# Patient Record
Sex: FEMALE | Race: WHITE | NOT HISPANIC OR LATINO | Employment: FULL TIME | URBAN - METROPOLITAN AREA
[De-identification: names, ages, dates, MRNs, and addresses within clinical notes are randomized per-mention and may not be internally consistent; named-entity substitution may affect disease eponyms.]

---

## 2020-07-18 ENCOUNTER — HOSPITAL ENCOUNTER (OUTPATIENT)
Facility: HOSPITAL | Age: 50
Setting detail: OBSERVATION
Discharge: HOME | End: 2020-07-19
Attending: EMERGENCY MEDICINE | Admitting: INTERNAL MEDICINE

## 2020-07-18 ENCOUNTER — APPOINTMENT (OUTPATIENT)
Dept: GENERAL RADIOLOGY | Facility: HOSPITAL | Age: 50
End: 2020-07-18

## 2020-07-18 DIAGNOSIS — R07.9 CHEST PAIN, UNSPECIFIED TYPE: Primary | ICD-10-CM

## 2020-07-18 LAB
ALBUMIN SERPL-MCNC: 4.1 G/DL (ref 3.5–5.2)
ALBUMIN/GLOB SERPL: 1.7 G/DL
ALP SERPL-CCNC: 59 U/L (ref 39–117)
ALT SERPL W P-5'-P-CCNC: 26 U/L (ref 1–33)
ANION GAP SERPL CALCULATED.3IONS-SCNC: 15 MMOL/L (ref 5–15)
APTT PPP: 22.8 SECONDS (ref 24–31)
AST SERPL-CCNC: 19 U/L (ref 1–32)
BASOPHILS # BLD AUTO: 0 10*3/MM3 (ref 0–0.2)
BASOPHILS NFR BLD AUTO: 0.3 % (ref 0–1.5)
BILIRUB SERPL-MCNC: <0.2 MG/DL (ref 0–1.2)
BUN SERPL-MCNC: 11 MG/DL (ref 6–20)
BUN SERPL-MCNC: ABNORMAL MG/DL
BUN/CREAT SERPL: ABNORMAL
CALCIUM SPEC-SCNC: 8.9 MG/DL (ref 8.6–10.5)
CHLORIDE SERPL-SCNC: 103 MMOL/L (ref 98–107)
CO2 SERPL-SCNC: 22 MMOL/L (ref 22–29)
CREAT SERPL-MCNC: 0.62 MG/DL (ref 0.57–1)
D DIMER PPP FEU-MCNC: 0.34 MCGFEU/ML (ref 0.17–0.59)
DEPRECATED RDW RBC AUTO: 42 FL (ref 37–54)
EOSINOPHIL # BLD AUTO: 0.3 10*3/MM3 (ref 0–0.4)
EOSINOPHIL NFR BLD AUTO: 3 % (ref 0.3–6.2)
ERYTHROCYTE [DISTWIDTH] IN BLOOD BY AUTOMATED COUNT: 13.2 % (ref 12.3–15.4)
GFR SERPL CREATININE-BSD FRML MDRD: 103 ML/MIN/1.73
GLOBULIN UR ELPH-MCNC: 2.4 GM/DL
GLUCOSE SERPL-MCNC: 119 MG/DL (ref 65–99)
HCT VFR BLD AUTO: 36.4 % (ref 34–46.6)
HGB BLD-MCNC: 12.7 G/DL (ref 12–15.9)
INR PPP: 1.02 (ref 0.9–1.1)
LIPASE SERPL-CCNC: 46 U/L (ref 13–60)
LYMPHOCYTES # BLD AUTO: 3.4 10*3/MM3 (ref 0.7–3.1)
LYMPHOCYTES NFR BLD AUTO: 36.3 % (ref 19.6–45.3)
MCH RBC QN AUTO: 31.5 PG (ref 26.6–33)
MCHC RBC AUTO-ENTMCNC: 34.8 G/DL (ref 31.5–35.7)
MCV RBC AUTO: 90.3 FL (ref 79–97)
MONOCYTES # BLD AUTO: 0.8 10*3/MM3 (ref 0.1–0.9)
MONOCYTES NFR BLD AUTO: 8 % (ref 5–12)
NEUTROPHILS NFR BLD AUTO: 4.9 10*3/MM3 (ref 1.7–7)
NEUTROPHILS NFR BLD AUTO: 52.4 % (ref 42.7–76)
NRBC BLD AUTO-RTO: 0 /100 WBC (ref 0–0.2)
PLATELET # BLD AUTO: 312 10*3/MM3 (ref 140–450)
PMV BLD AUTO: 7.9 FL (ref 6–12)
POTASSIUM SERPL-SCNC: 3.9 MMOL/L (ref 3.5–5.2)
PROT SERPL-MCNC: 6.5 G/DL (ref 6–8.5)
PROTHROMBIN TIME: 10.6 SECONDS (ref 9.6–11.7)
RBC # BLD AUTO: 4.03 10*6/MM3 (ref 3.77–5.28)
SODIUM SERPL-SCNC: 140 MMOL/L (ref 136–145)
TROPONIN T SERPL-MCNC: <0.01 NG/ML (ref 0–0.03)
WBC # BLD AUTO: 9.4 10*3/MM3 (ref 3.4–10.8)

## 2020-07-18 PROCEDURE — 85610 PROTHROMBIN TIME: CPT | Performed by: EMERGENCY MEDICINE

## 2020-07-18 PROCEDURE — 85730 THROMBOPLASTIN TIME PARTIAL: CPT | Performed by: EMERGENCY MEDICINE

## 2020-07-18 PROCEDURE — 85379 FIBRIN DEGRADATION QUANT: CPT | Performed by: EMERGENCY MEDICINE

## 2020-07-18 PROCEDURE — 80053 COMPREHEN METABOLIC PANEL: CPT | Performed by: EMERGENCY MEDICINE

## 2020-07-18 PROCEDURE — 93005 ELECTROCARDIOGRAM TRACING: CPT | Performed by: EMERGENCY MEDICINE

## 2020-07-18 PROCEDURE — 83690 ASSAY OF LIPASE: CPT | Performed by: EMERGENCY MEDICINE

## 2020-07-18 PROCEDURE — 85025 COMPLETE CBC W/AUTO DIFF WBC: CPT | Performed by: EMERGENCY MEDICINE

## 2020-07-18 PROCEDURE — 99284 EMERGENCY DEPT VISIT MOD MDM: CPT

## 2020-07-18 PROCEDURE — 71045 X-RAY EXAM CHEST 1 VIEW: CPT

## 2020-07-18 PROCEDURE — 84484 ASSAY OF TROPONIN QUANT: CPT | Performed by: EMERGENCY MEDICINE

## 2020-07-18 RX ORDER — ASPIRIN 81 MG/1
324 TABLET, CHEWABLE ORAL ONCE
Status: COMPLETED | OUTPATIENT
Start: 2020-07-18 | End: 2020-07-18

## 2020-07-18 RX ORDER — SODIUM CHLORIDE 0.9 % (FLUSH) 0.9 %
10 SYRINGE (ML) INJECTION AS NEEDED
Status: DISCONTINUED | OUTPATIENT
Start: 2020-07-18 | End: 2020-07-19 | Stop reason: HOSPADM

## 2020-07-18 RX ADMIN — ASPIRIN 81 MG 324 MG: 81 TABLET ORAL at 23:56

## 2020-07-19 ENCOUNTER — APPOINTMENT (OUTPATIENT)
Dept: NUCLEAR MEDICINE | Facility: HOSPITAL | Age: 50
End: 2020-07-19

## 2020-07-19 VITALS
HEIGHT: 64 IN | WEIGHT: 233.03 LBS | OXYGEN SATURATION: 98 % | TEMPERATURE: 97.9 F | DIASTOLIC BLOOD PRESSURE: 78 MMHG | HEART RATE: 66 BPM | RESPIRATION RATE: 15 BRPM | BODY MASS INDEX: 39.78 KG/M2 | SYSTOLIC BLOOD PRESSURE: 116 MMHG

## 2020-07-19 PROBLEM — R07.9 CHEST PAIN: Status: ACTIVE | Noted: 2020-07-19

## 2020-07-19 LAB
BH CV NUCLEAR PRIOR STUDY: 3
BH CV STRESS BP STAGE 1: NORMAL
BH CV STRESS BP STAGE 2: NORMAL
BH CV STRESS BP STAGE 3: NORMAL
BH CV STRESS BP STAGE 4: NORMAL
BH CV STRESS COMMENTS STAGE 1: NORMAL
BH CV STRESS COMMENTS STAGE 2: NORMAL
BH CV STRESS DOSE REGADENOSON STAGE 1: 0.4
BH CV STRESS DURATION MIN STAGE 1: 0
BH CV STRESS DURATION MIN STAGE 2: 4
BH CV STRESS DURATION SEC STAGE 1: 10
BH CV STRESS DURATION SEC STAGE 2: 0
BH CV STRESS HR STAGE 1: 84
BH CV STRESS HR STAGE 2: 93
BH CV STRESS HR STAGE 3: 91
BH CV STRESS HR STAGE 4: 82
BH CV STRESS PROTOCOL 1: NORMAL
BH CV STRESS RECOVERY BP: NORMAL MMHG
BH CV STRESS RECOVERY HR: 71 BPM
BH CV STRESS STAGE 1: 1
BH CV STRESS STAGE 2: 2
BH CV STRESS STAGE 3: 3
BH CV STRESS STAGE 4: 4
CHOLEST SERPL-MCNC: 169 MG/DL (ref 0–200)
HDLC SERPL-MCNC: 38 MG/DL (ref 40–60)
HOLD SPECIMEN: NORMAL
HOLD SPECIMEN: NORMAL
LDLC SERPL CALC-MCNC: 105 MG/DL (ref 0–100)
LDLC/HDLC SERPL: 2.77 {RATIO}
MAGNESIUM SERPL-MCNC: 1.9 MG/DL (ref 1.6–2.6)
MAXIMAL PREDICTED HEART RATE: 172 BPM
PERCENT MAX PREDICTED HR: 54.07 %
STRESS BASELINE BP: NORMAL MMHG
STRESS BASELINE HR: 59 BPM
STRESS PERCENT HR: 64 %
STRESS POST PEAK BP: NORMAL MMHG
STRESS POST PEAK HR: 93 BPM
STRESS TARGET HR: 146 BPM
TRIGL SERPL-MCNC: 128 MG/DL (ref 0–150)
TROPONIN T SERPL-MCNC: <0.01 NG/ML (ref 0–0.03)
TROPONIN T SERPL-MCNC: <0.01 NG/ML (ref 0–0.03)
VLDLC SERPL-MCNC: 25.6 MG/DL
WHOLE BLOOD HOLD SPECIMEN: NORMAL
WHOLE BLOOD HOLD SPECIMEN: NORMAL

## 2020-07-19 PROCEDURE — G0378 HOSPITAL OBSERVATION PER HR: HCPCS

## 2020-07-19 PROCEDURE — 78452 HT MUSCLE IMAGE SPECT MULT: CPT | Performed by: INTERNAL MEDICINE

## 2020-07-19 PROCEDURE — 93017 CV STRESS TEST TRACING ONLY: CPT

## 2020-07-19 PROCEDURE — 0 TECHNETIUM SESTAMIBI: Performed by: INTERNAL MEDICINE

## 2020-07-19 PROCEDURE — 99236 HOSP IP/OBS SAME DATE HI 85: CPT | Performed by: INTERNAL MEDICINE

## 2020-07-19 PROCEDURE — A9500 TC99M SESTAMIBI: HCPCS | Performed by: INTERNAL MEDICINE

## 2020-07-19 PROCEDURE — 25010000002 REGADENOSON 0.4 MG/5ML SOLUTION: Performed by: INTERNAL MEDICINE

## 2020-07-19 PROCEDURE — 83735 ASSAY OF MAGNESIUM: CPT | Performed by: NURSE PRACTITIONER

## 2020-07-19 PROCEDURE — 84484 ASSAY OF TROPONIN QUANT: CPT | Performed by: NURSE PRACTITIONER

## 2020-07-19 PROCEDURE — 78452 HT MUSCLE IMAGE SPECT MULT: CPT

## 2020-07-19 PROCEDURE — 93016 CV STRESS TEST SUPVJ ONLY: CPT | Performed by: INTERNAL MEDICINE

## 2020-07-19 PROCEDURE — 93018 CV STRESS TEST I&R ONLY: CPT | Performed by: INTERNAL MEDICINE

## 2020-07-19 PROCEDURE — 80061 LIPID PANEL: CPT | Performed by: NURSE PRACTITIONER

## 2020-07-19 RX ORDER — PANTOPRAZOLE SODIUM 40 MG/1
40 TABLET, DELAYED RELEASE ORAL 2 TIMES DAILY
Qty: 60 TABLET | Refills: 0 | OUTPATIENT
Start: 2020-07-19 | End: 2021-11-23

## 2020-07-19 RX ORDER — ACETAMINOPHEN 325 MG/1
650 TABLET ORAL EVERY 4 HOURS PRN
Status: DISCONTINUED | OUTPATIENT
Start: 2020-07-19 | End: 2020-07-19 | Stop reason: HOSPADM

## 2020-07-19 RX ORDER — NITROGLYCERIN 0.4 MG/1
0.4 TABLET SUBLINGUAL
Status: DISCONTINUED | OUTPATIENT
Start: 2020-07-19 | End: 2020-07-19 | Stop reason: HOSPADM

## 2020-07-19 RX ORDER — ACETAMINOPHEN 650 MG/1
650 SUPPOSITORY RECTAL EVERY 4 HOURS PRN
Status: DISCONTINUED | OUTPATIENT
Start: 2020-07-19 | End: 2020-07-19 | Stop reason: HOSPADM

## 2020-07-19 RX ORDER — ONDANSETRON 4 MG/1
4 TABLET, FILM COATED ORAL EVERY 6 HOURS PRN
Status: DISCONTINUED | OUTPATIENT
Start: 2020-07-19 | End: 2020-07-19 | Stop reason: HOSPADM

## 2020-07-19 RX ORDER — ACETAMINOPHEN 160 MG/5ML
650 SOLUTION ORAL EVERY 4 HOURS PRN
Status: DISCONTINUED | OUTPATIENT
Start: 2020-07-19 | End: 2020-07-19 | Stop reason: HOSPADM

## 2020-07-19 RX ORDER — PANTOPRAZOLE SODIUM 40 MG/1
40 TABLET, DELAYED RELEASE ORAL EVERY MORNING
Status: DISCONTINUED | OUTPATIENT
Start: 2020-07-19 | End: 2020-07-19 | Stop reason: HOSPADM

## 2020-07-19 RX ORDER — IBUPROFEN 200 MG
200 TABLET ORAL EVERY 6 HOURS PRN
COMMUNITY
End: 2020-07-19 | Stop reason: HOSPADM

## 2020-07-19 RX ORDER — SODIUM CHLORIDE 0.9 % (FLUSH) 0.9 %
10 SYRINGE (ML) INJECTION AS NEEDED
Status: DISCONTINUED | OUTPATIENT
Start: 2020-07-19 | End: 2020-07-19 | Stop reason: HOSPADM

## 2020-07-19 RX ORDER — CHOLECALCIFEROL (VITAMIN D3) 125 MCG
5 CAPSULE ORAL NIGHTLY PRN
Status: DISCONTINUED | OUTPATIENT
Start: 2020-07-19 | End: 2020-07-19 | Stop reason: HOSPADM

## 2020-07-19 RX ORDER — ONDANSETRON 2 MG/ML
4 INJECTION INTRAMUSCULAR; INTRAVENOUS EVERY 6 HOURS PRN
Status: DISCONTINUED | OUTPATIENT
Start: 2020-07-19 | End: 2020-07-19 | Stop reason: HOSPADM

## 2020-07-19 RX ORDER — SODIUM CHLORIDE 0.9 % (FLUSH) 0.9 %
10 SYRINGE (ML) INJECTION EVERY 12 HOURS SCHEDULED
Status: DISCONTINUED | OUTPATIENT
Start: 2020-07-19 | End: 2020-07-19 | Stop reason: HOSPADM

## 2020-07-19 RX ADMIN — Medication 10 ML: at 02:09

## 2020-07-19 RX ADMIN — TECHNETIUM TC 99M SESTAMIBI 1 DOSE: 1 INJECTION INTRAVENOUS at 12:15

## 2020-07-19 RX ADMIN — PANTOPRAZOLE SODIUM 40 MG: 40 TABLET, DELAYED RELEASE ORAL at 05:45

## 2020-07-19 RX ADMIN — Medication 10 ML: at 08:07

## 2020-07-19 RX ADMIN — TECHNETIUM TC 99M SESTAMIBI 1 DOSE: 1 INJECTION INTRAVENOUS at 10:20

## 2020-07-19 RX ADMIN — REGADENOSON 0.4 MG: 0.08 INJECTION, SOLUTION INTRAVENOUS at 12:15

## 2020-07-19 NOTE — PLAN OF CARE
Problem: Patient Care Overview  Goal: Plan of Care Review  Outcome: Ongoing (interventions implemented as appropriate)  Flowsheets  Taken 7/19/2020 0250  Progress: no change  Outcome Summary: new admit  will have a myoview in the morning  Taken 7/19/2020 0133  Plan of Care Reviewed With: patient     Problem: Patient Care Overview  Goal: Discharge Needs Assessment  Outcome: Ongoing (interventions implemented as appropriate)  Flowsheets  Taken 7/19/2020 0250  Equipment Needed After Discharge: none  Anticipated Changes Related to Illness: none  Transportation Concerns: car, none  Concerns to be Addressed: no discharge needs identified  Readmission Within the Last 30 Days: no previous admission in last 30 days  Taken 7/19/2020 0132  Equipment Currently Used at Home: none  Taken 7/19/2020 0134  Transportation Anticipated: car, drives self;family or friend will provide  Patient/Family Anticipated Services at Transition: none  Patient/Family Anticipates Transition to: home with family     Problem: Cardiac: ACS (Acute Coronary Syndrome) (Adult)  Goal: Signs and Symptoms of Listed Potential Problems Will be Absent, Minimized or Managed (Cardiac: ACS)  Outcome: Ongoing (interventions implemented as appropriate)  Flowsheets (Taken 7/19/2020 0250)  Problems Assessed (Acute Coronary Syndrome): all  Problems Present (Acute Coronary Syn): none

## 2020-07-19 NOTE — ED PROVIDER NOTES
Subjective   50-year-old female with history of GERD complains of episodic chest pain primarily in the evening since Tuesday associated with nausea and vomiting upper back pain.  Patient did have an episode of right arm pain and tingling but that has resolved.  Pain is nonexertional but there is associated shortness of air.  Patient denies any true DVT or PE or company restricts travel, any calf pain or swelling.  Patient denies any history of tobacco abuse, diabetes, hypertension, hyperlipidemia.  Patient's father reportedly had an MI at 21 years of age but also was an alcoholic and  from complications related to cirrhosis at 46 years of age.  Pain recurred at 2 AM Saturday morning and has been constant all day today.  Pain is moderate.          Review of Systems   Respiratory: Positive for cough and shortness of breath.    Cardiovascular: Positive for chest pain.   Gastrointestinal: Positive for nausea and vomiting.   Musculoskeletal: Positive for back pain.        As per HPI   Neurological:        Tingling right arm, noise negative, resolved.   All other systems reviewed and are negative.      No past medical history on file.    Allergies   Allergen Reactions   • Percocet [Oxycodone-Acetaminophen] Rash       No past surgical history on file.    No family history on file.    Social History     Socioeconomic History   • Marital status:      Spouse name: Not on file   • Number of children: Not on file   • Years of education: Not on file   • Highest education level: Not on file           Objective   Physical Exam   Constitutional: She is oriented to person, place, and time. She appears well-developed and well-nourished.   HENT:   Head: Normocephalic and atraumatic.   Mouth/Throat: Oropharynx is clear and moist.   Eyes: Pupils are equal, round, and reactive to light. Conjunctivae are normal.   Neck: Normal range of motion. Neck supple.   Cardiovascular: Normal rate, regular rhythm, normal heart sounds and  intact distal pulses.   Pulmonary/Chest: Effort normal and breath sounds normal.   Chest wall nontender   Abdominal: Soft. Bowel sounds are normal. She exhibits no distension. There is no tenderness.   Musculoskeletal: Normal range of motion. She exhibits no edema or tenderness.   Neurological: She is alert and oriented to person, place, and time.   Skin: Skin is warm and dry. Capillary refill takes less than 2 seconds.   Psychiatric: She has a normal mood and affect. Her behavior is normal.       Procedures           ED Course  ED Course as of Jul 19 0010   Sat Jul 18, 2020   2302 EKG interpretation: Sinus bradycardia, rate 59, nonspecific ST depression, no STEMI seen    [JR]      ED Course User Index  [JR] Jose Carlos Chau MD                                           MDM  Number of Diagnoses or Management Options  Chest pain, unspecified type:   Diagnosis management comments: Results for orders placed or performed during the hospital encounter of 07/18/20  -Comprehensive Metabolic Panel       Result                      Value             Ref Range           Glucose                     119 (H)           65 - 99 mg/dL       BUN                                                               Creatinine                  0.62              0.57 - 1.00 *       Sodium                      140               136 - 145 mm*       Potassium                   3.9               3.5 - 5.2 mm*       Chloride                    103               98 - 107 mmo*       CO2                         22.0              22.0 - 29.0 *       Calcium                     8.9               8.6 - 10.5 m*       Total Protein               6.5               6.0 - 8.5 g/*       Albumin                     4.10              3.50 - 5.20 *       ALT (SGPT)                  26                1 - 33 U/L          AST (SGOT)                  19                1 - 32 U/L          Alkaline Phosphatase        59                39 - 117 U/L        Total Bilirubin              <0.2              0.0 - 1.2 mg*       eGFR Non  Amer       103               >60 mL/min/1*       Globulin                    2.4               gm/dL               A/G Ratio                   1.7               g/dL                BUN/Creatinine Ratio                                              Anion Gap                   15.0              5.0 - 15.0 m*  -Protime-INR       Result                      Value             Ref Range           Protime                     10.6              9.6 - 11.7 S*       INR                         1.02              0.90 - 1.10    -aPTT       Result                      Value             Ref Range           PTT                         22.8 (L)          24.0 - 31.0 *  -D-dimer, Quantitative       Result                      Value             Ref Range           D-Dimer, Quantitative       0.34              0.17 - 0.59 *  -Troponin       Result                      Value             Ref Range           Troponin T                  <0.010            0.000 - 0.03*  -Lipase       Result                      Value             Ref Range           Lipase                      46                13 - 60 U/L    -CBC Auto Differential       Result                      Value             Ref Range           WBC                         9.40              3.40 - 10.80*       RBC                         4.03              3.77 - 5.28 *       Hemoglobin                  12.7              12.0 - 15.9 *       Hematocrit                  36.4              34.0 - 46.6 %       MCV                         90.3              79.0 - 97.0 *       MCH                         31.5              26.6 - 33.0 *       MCHC                        34.8              31.5 - 35.7 *       RDW                         13.2              12.3 - 15.4 %       RDW-SD                      42.0              37.0 - 54.0 *       MPV                         7.9               6.0 - 12.0 fL       Platelets                    312               140 - 450 10*       Neutrophil %                52.4              42.7 - 76.0 %       Lymphocyte %                36.3              19.6 - 45.3 %       Monocyte %                  8.0               5.0 - 12.0 %        Eosinophil %                3.0               0.3 - 6.2 %         Basophil %                  0.3               0.0 - 1.5 %         Neutrophils, Absolute       4.90              1.70 - 7.00 *       Lymphocytes, Absolute       3.40 (H)          0.70 - 3.10 *       Monocytes, Absolute         0.80              0.10 - 0.90 *       Eosinophils, Absolute       0.30              0.00 - 0.40 *       Basophils, Absolute         0.00              0.00 - 0.20 *       nRBC                        0.0               0.0 - 0.2 /1*  -BUN       Result                      Value             Ref Range           BUN                         11                6 - 20 mg/dL   -Light Blue Top       Result                      Value             Ref Range           Extra Tube                                                    hold for add-on  -Green Top (Gel)       Result                      Value             Ref Range           Extra Tube                                                    Hold for add-ons.  -Lavender Top       Result                      Value             Ref Range           Extra Tube                                                    hold for add-on  -Gold Top - SST       Result                      Value             Ref Range           Extra Tube                                                    Hold for add-ons.    Patient appears well, pain mild at present, nonspecific changes on EKG, for that reason will admit.       Amount and/or Complexity of Data Reviewed  Clinical lab tests: reviewed  Tests in the radiology section of CPT®: reviewed        Final diagnoses:   Chest pain, unspecified type            Jose Carlos Chau MD  07/19/20 0011

## 2020-07-19 NOTE — PLAN OF CARE
Problem: Patient Care Overview  Goal: Plan of Care Review  Outcome: Outcome(s) achieved  Flowsheets  Taken 7/19/2020 1518  Progress: improving  Outcome Summary: Pts myoview was negative and will discharge home. patient remains pain free at this time.  Taken 7/19/2020 0710  Plan of Care Reviewed With: patient;spouse  Goal: Individualization and Mutuality  Outcome: Outcome(s) achieved  Goal: Discharge Needs Assessment  Outcome: Outcome(s) achieved  Goal: Interprofessional Rounds/Family Conf  Outcome: Outcome(s) achieved     Problem: Cardiac: ACS (Acute Coronary Syndrome) (Adult)  Goal: Signs and Symptoms of Listed Potential Problems Will be Absent, Minimized or Managed (Cardiac: ACS)  Outcome: Outcome(s) achieved

## 2020-07-19 NOTE — DISCHARGE SUMMARY
[facility] Medicine Services  DISCHARGE SUMMARY        Prepared For PCP:  Shikha Scherer    Patient Name: Gerri Salcido  : 1970  MRN: [id number]      Date of Admission:   2020    Date of Discharge:  2020    Length of stay:  LOS: 0 days     Hospital Course     Presenting Problem:   Chest pain, unspecified type [R07.9]      Active Hospital Problems    Diagnosis  POA   • Chest pain [R07.9]  Yes     Priority: Medium      Resolved Hospital Problems   No resolved problems to display.           Hospital Course:  Gerri Salcido is a 50 y.o. female who presented with severe chest pain.  The patient has a history of gastroesophageal reflux disease but reports that this is much worse than she has had previously.  The patient had been taking over-the-counter ibuprofen for body aches.  The patient did not have any nausea or vomiting.  The patient was admitted and ruled out for myocardial injury.  The patient then underwent a Kate scan which was completely negative.  The patient will be discharged on Protonix rather than Nexium and a twice daily dosing pattern.    The patient is a full code at the time of discharge.  The patient's medications are as listed in that section of this report.  The patient has no pending studies.  The patient is on a regular diet.  The patient should follow-up with her primary care provider in 3 to 5 days.  The patient is discharged in satisfactory condition.    Recommendation for Outpatient Providers:     Reasons For Change In Medications and Indications for New Medications:  Nexium has been changed to Protonix 40 mg p.o. twice daily      Day of Discharge     HPI:   See physical exam from earlier today    Vital Signs:   Temp:  [97.5 °F (36.4 °C)-98.4 °F (36.9 °C)] 97.6 °F (36.4 °C)  Heart Rate:  [57-70] 58  Resp:  [12-17] 16  BP: (108-150)/(62-89) 108/72     Physical Exam:  Physical Exam  Please see physical exam from earlier today.  Pertinent  and/or  Most Recent Results     Results from last 7 days   Lab Units 07/18/20  2249   WBC 10*3/mm3 9.40   HEMOGLOBIN g/dL 12.7   HEMATOCRIT % 36.4   PLATELETS 10*3/mm3 312   SODIUM mmol/L 140   POTASSIUM mmol/L 3.9   CHLORIDE mmol/L 103   CO2 mmol/L 22.0   BUN  11   CREATININE mg/dL 0.62   GLUCOSE mg/dL 119*   CALCIUM mg/dL 8.9     Results from last 7 days   Lab Units 07/18/20  2249   BILIRUBIN mg/dL <0.2   ALK PHOS U/L 59   ALT (SGPT) U/L 26   AST (SGOT) U/L 19   PROTIME Seconds 10.6   INR  1.02   APTT seconds 22.8*     Results from last 7 days   Lab Units 07/19/20  0423   CHOLESTEROL mg/dL 169   TRIGLYCERIDES mg/dL 128   HDL CHOL mg/dL 38*     Results from last 7 days   Lab Units 07/19/20  1006 07/19/20  0423 07/18/20  2249   TROPONIN T ng/mL <0.010 <0.010 <0.010     Brief Urine Lab Results     None        Microbiology Results Abnormal     None        Xr Chest 1 View    Result Date: 7/19/2020  Impression: No acute cardiopulmonary abnormality.  Electronically Signed By-Jose Angel Campbell On:7/19/2020 8:58 AM This report was finalized on [id number] by  Jose Angel Campbell, .           Test Results Pending at Discharge  None      Procedures Performed  Nuclear stress test which was negative         Consults:   Consults     Date and Time Order Name Status Description    7/19/2020 0008 Inpatient Hospitalist Consult Completed         Discharge Details        Discharge Medications      New Medications      Instructions Start Date   pantoprazole 40 MG EC tablet  Commonly known as:  PROTONIX  Replaces:  esomeprazole 20 MG capsule   40 mg, Oral, 2 times daily         Stop These Medications    esomeprazole 20 MG capsule  Commonly known as:  nexIUM  Replaced by:  pantoprazole 40 MG EC tablet     ibuprofen 200 MG tablet  Commonly known as:  ADVIL,MOTRIN          Allergies   Allergen Reactions   • Percocet [Oxycodone-Acetaminophen] Rash     Discharge Disposition:  Home or Self Care    Diet:  Hospital:  Diet Order   Procedures   • Diet  Cardiac; Healthy Heart     Discharge Activity:     CODE STATUS:    Code Status and Medical Interventions:   Ordered at: 07/19/20 0031     Code Status:    CPR     Medical Interventions (Level of Support Prior to Arrest):    Full     Follow-up Appointments  No future appointments.  Follow-up with primary care provider in 3 to 5 days.    Condition on Discharge:    Stable    Electronically signed by Jaye Chu MD, 07/19/20, 2:52 PM.    Time: I spent  30 minutes on this discharge activity which included face-to-face encounter with the patient/reviewing the data in the system/coordination of the care with the nursing staff as well as consultants/documentation/entering orders.

## 2020-07-19 NOTE — H&P
[facility]      Patient Name: Gerri Salcido  : 1970  MRN: [id number]  Primary Care Physician: Shikha Scherer  Date of admission: 2020    Patient Care Team:  Shikha Scherer as PCP - General (Nurse Practitioner)  Provider, No Known as PCP - Family Medicine          Subjective   History Present Illness     Chief Complaint:   Chief Complaint   Patient presents with   • Chest Pain                50 year old morbidly obese BMI female presents with complaints of chest pain radiating to right arm with numbness, diaphoresis and vomiting intermittently since Tuesday. She denies dizziness but does report shortness of air increased with activity. She reports her father had an MI age 21 but was an alcohol abuser. She denies HTN, CAD, HLD. She reports she does snore but has never been evaluated for sleep apnea. She currently does not smoke. She denies cough , fever or congestion. Troponin was negative , EKG NSR. She has never had a cardiac evaluation. PMH includes GERD. She reports she has a gallbladder and has had numerous ultrasounds in past which were normal. D-Dimer was not elevated. She will be admitted for serial troponin, lipid panel in am and myoview stress test .       Review of Systems   Constitution: Positive for diaphoresis.   HENT: Negative.    Eyes: Negative.    Cardiovascular: Positive for chest pain and dyspnea on exertion.   Respiratory: Positive for shortness of breath.    Endocrine: Negative.    Hematologic/Lymphatic: Negative.    Skin: Negative.    Musculoskeletal: Negative.    Gastrointestinal: Positive for nausea and vomiting.   Genitourinary: Negative.    Neurological: Negative.    Psychiatric/Behavioral: Negative.    Allergic/Immunologic: Negative.            Personal History     Past Medical History:   Past Medical History:   Diagnosis Date   • GERD (gastroesophageal reflux disease)    • Obesity        Surgical History:    History reviewed.  No pertinent surgical history.        Family History: family history includes Heart disease in her father. Otherwise pertinent FHx was reviewed and unremarkable.     Social History:  reports that she has quit smoking. She has never used smokeless tobacco. She reports that she drinks alcohol. She reports that she does not use drugs.      Medications:  Prior to Admission medications    Medication Sig Start Date End Date Taking? Authorizing Provider   esomeprazole (nexIUM) 20 MG capsule Take 20 mg by mouth Every Morning Before Breakfast.   Yes Provider, Historical, MD       Allergies:    Allergies   Allergen Reactions   • Percocet [Oxycodone-Acetaminophen] Rash       Objective   Objective     Vital Signs  Temp:  [97.8 °F (36.6 °C)-98.4 °F (36.9 °C)] 97.8 °F (36.6 °C)  Heart Rate:  [61-70] 61  Resp:  [16-17] 16  BP: (119-150)/(62-89) 134/80  SpO2:  [96 %-99 %] 98 %  on   ;   Device (Oxygen Therapy): (P) room air  Body mass index is 40 kg/m².    Physical Exam   Constitutional: She is oriented to person, place, and time. She appears well-developed and well-nourished.   HENT:   Head: Normocephalic and atraumatic.   Eyes: EOM are normal.   Neck: Normal range of motion. Neck supple.   Cardiovascular: Normal rate, regular rhythm and normal heart sounds.   Pulmonary/Chest: Effort normal and breath sounds normal.   Abdominal: Soft. Bowel sounds are normal.   Genitourinary:   Genitourinary Comments: deferred   Musculoskeletal: Normal range of motion.   Neurological: She is alert and oriented to person, place, and time.   Skin: Skin is warm and dry.   Psychiatric: She has a normal mood and affect. Her behavior is normal. Judgment and thought content normal.   Vitals reviewed.      Results Review:  I have personally reviewed most recent lab results and agree with findings, most notably: troponin.    Results from last 7 days   Lab Units 07/18/20  2249   WBC 10*3/mm3 9.40   HEMOGLOBIN g/dL 12.7   HEMATOCRIT % 36.4   PLATELETS  10*3/mm3 312   INR  1.02     Results from last 7 days   Lab Units 07/18/20  2249   SODIUM mmol/L 140   POTASSIUM mmol/L 3.9   CHLORIDE mmol/L 103   CO2 mmol/L 22.0   BUN  11   CREATININE mg/dL 0.62   GLUCOSE mg/dL 119*   CALCIUM mg/dL 8.9   ALT (SGPT) U/L 26   AST (SGOT) U/L 19   TROPONIN T ng/mL <0.010     Estimated Creatinine Clearance: 131.7 mL/min (by C-G formula based on SCr of 0.62 mg/dL).  Brief Urine Lab Results     None          Microbiology Results (last 10 days)     ** No results found for the last 240 hours. **          ECG/EMG Results (most recent)     Procedure Component Value Units Date/Time    ECG 12 Lead [[id number]] Collected:  07/18/20 2258     Updated:  07/18/20 2300    Narrative:       HEART RATE= 59  bpm  RR Interval= 1012  ms  NM Interval= 148  ms  P Horizontal Axis= 0  deg  P Front Axis= -4  deg  QRSD Interval= 89  ms  QT Interval= 444  ms  QRS Axis= 26  deg  T Wave Axis= 4  deg  - OTHERWISE NORMAL ECG -  Sinus bradycardia  Electronically Signed By:   Date and Time of Study: 2020-07-18 22:58:32                    No radiology results for the last 7 days      Estimated Creatinine Clearance: 131.7 mL/min (by C-G formula based on SCr of 0.62 mg/dL).    Assessment/Plan   Assessment/Plan       Active Hospital Problems    Diagnosis  POA   • Chest pain [R07.9]  Yes      Resolved Hospital Problems   No resolved problems to display.     Chest pain given risk factors for CAD cannot rule out ACS, serial troponin, lipid panel in am , Myoview stress test .Aspirin in ED    GERD- on PPI    Morbid Obesity BMI 40- lifestyle management education .    Possible Undiagnoses NICHOL- refer to sleep study on discharge       VTE Prophylaxis -   Mechanical Order History:     None      Pharmalogical Order History:     Ordered     Dose Route Frequency Stop    Signed and Held  enoxaparin (LOVENOX) syringe 40 mg      40 mg SC Every 24 Hours --          CODE STATUS:    Code Status and Medical Interventions:   Ordered at:  07/19/20 0031     Code Status:    CPR     Medical Interventions (Level of Support Prior to Arrest):    Full       This patient has been examined wearing appropriate Personal Protective Equipment . 07/19/20      I discussed the patient's findings and my recommendations with patient.        Electronically signed by ABE Sutherland, 07/19/20, 12:32 AM.  [facility], [place] Hospitalist Team

## 2023-03-07 ENCOUNTER — OFFICE (AMBULATORY)
Dept: URBAN - METROPOLITAN AREA CLINIC 76 | Facility: CLINIC | Age: 53
End: 2023-03-07

## 2023-03-07 VITALS
HEIGHT: 62 IN | DIASTOLIC BLOOD PRESSURE: 84 MMHG | WEIGHT: 234 LBS | SYSTOLIC BLOOD PRESSURE: 128 MMHG | OXYGEN SATURATION: 98 % | HEART RATE: 79 BPM

## 2023-03-07 DIAGNOSIS — R14.2 ERUCTATION: ICD-10-CM

## 2023-03-07 DIAGNOSIS — R14.0 ABDOMINAL DISTENSION (GASEOUS): ICD-10-CM

## 2023-03-07 DIAGNOSIS — R63.4 ABNORMAL WEIGHT LOSS: ICD-10-CM

## 2023-03-07 DIAGNOSIS — R05.9 COUGH, UNSPECIFIED: ICD-10-CM

## 2023-03-07 DIAGNOSIS — R10.13 EPIGASTRIC PAIN: ICD-10-CM

## 2023-03-07 DIAGNOSIS — Z12.11 ENCOUNTER FOR SCREENING FOR MALIGNANT NEOPLASM OF COLON: ICD-10-CM

## 2023-03-07 DIAGNOSIS — K21.9 GASTRO-ESOPHAGEAL REFLUX DISEASE WITHOUT ESOPHAGITIS: ICD-10-CM

## 2023-03-07 PROCEDURE — 99204 OFFICE O/P NEW MOD 45 MIN: CPT | Performed by: INTERNAL MEDICINE

## 2023-03-07 RX ORDER — ESOMEPRAZOLE MAGNESIUM 40 MG/1
40 CAPSULE, DELAYED RELEASE ORAL
Qty: 90 | Refills: 2 | Status: COMPLETED
End: 2023-04-06

## 2023-04-04 VITALS
HEART RATE: 72 BPM | RESPIRATION RATE: 13 BRPM | HEART RATE: 68 BPM | DIASTOLIC BLOOD PRESSURE: 80 MMHG | DIASTOLIC BLOOD PRESSURE: 75 MMHG | SYSTOLIC BLOOD PRESSURE: 139 MMHG | SYSTOLIC BLOOD PRESSURE: 123 MMHG | SYSTOLIC BLOOD PRESSURE: 144 MMHG | DIASTOLIC BLOOD PRESSURE: 82 MMHG | DIASTOLIC BLOOD PRESSURE: 91 MMHG | SYSTOLIC BLOOD PRESSURE: 157 MMHG | DIASTOLIC BLOOD PRESSURE: 72 MMHG | OXYGEN SATURATION: 92 % | RESPIRATION RATE: 22 BRPM | DIASTOLIC BLOOD PRESSURE: 87 MMHG | TEMPERATURE: 97.2 F | DIASTOLIC BLOOD PRESSURE: 76 MMHG | OXYGEN SATURATION: 95 % | SYSTOLIC BLOOD PRESSURE: 112 MMHG | HEART RATE: 79 BPM | OXYGEN SATURATION: 97 % | SYSTOLIC BLOOD PRESSURE: 147 MMHG | DIASTOLIC BLOOD PRESSURE: 92 MMHG | DIASTOLIC BLOOD PRESSURE: 73 MMHG | WEIGHT: 229 LBS | OXYGEN SATURATION: 98 % | DIASTOLIC BLOOD PRESSURE: 84 MMHG | RESPIRATION RATE: 16 BRPM | OXYGEN SATURATION: 89 % | HEART RATE: 70 BPM | SYSTOLIC BLOOD PRESSURE: 168 MMHG | DIASTOLIC BLOOD PRESSURE: 68 MMHG | HEART RATE: 75 BPM | OXYGEN SATURATION: 93 % | HEART RATE: 62 BPM | HEART RATE: 78 BPM | SYSTOLIC BLOOD PRESSURE: 143 MMHG | OXYGEN SATURATION: 94 % | SYSTOLIC BLOOD PRESSURE: 150 MMHG | RESPIRATION RATE: 18 BRPM | HEIGHT: 64 IN | DIASTOLIC BLOOD PRESSURE: 89 MMHG | OXYGEN SATURATION: 91 % | SYSTOLIC BLOOD PRESSURE: 126 MMHG | HEART RATE: 67 BPM | HEART RATE: 71 BPM | RESPIRATION RATE: 8 BRPM | OXYGEN SATURATION: 100 % | SYSTOLIC BLOOD PRESSURE: 120 MMHG | RESPIRATION RATE: 10 BRPM | OXYGEN SATURATION: 99 % | HEART RATE: 69 BPM | SYSTOLIC BLOOD PRESSURE: 156 MMHG | OXYGEN SATURATION: 96 % | HEART RATE: 74 BPM | DIASTOLIC BLOOD PRESSURE: 41 MMHG | SYSTOLIC BLOOD PRESSURE: 107 MMHG | RESPIRATION RATE: 14 BRPM | RESPIRATION RATE: 15 BRPM | HEART RATE: 65 BPM

## 2023-04-06 ENCOUNTER — OFFICE (AMBULATORY)
Dept: URBAN - METROPOLITAN AREA PATHOLOGY 4 | Facility: PATHOLOGY | Age: 53
End: 2023-04-06

## 2023-04-06 ENCOUNTER — AMBULATORY SURGICAL CENTER (AMBULATORY)
Dept: URBAN - METROPOLITAN AREA SURGERY 17 | Facility: SURGERY | Age: 53
End: 2023-04-06

## 2023-04-06 DIAGNOSIS — R14.2 ERUCTATION: ICD-10-CM

## 2023-04-06 DIAGNOSIS — K63.5 POLYP OF COLON: ICD-10-CM

## 2023-04-06 DIAGNOSIS — K64.9 UNSPECIFIED HEMORRHOIDS: ICD-10-CM

## 2023-04-06 DIAGNOSIS — Z12.11 ENCOUNTER FOR SCREENING FOR MALIGNANT NEOPLASM OF COLON: ICD-10-CM

## 2023-04-06 DIAGNOSIS — K22.70 BARRETT'S ESOPHAGUS WITHOUT DYSPLASIA: ICD-10-CM

## 2023-04-06 DIAGNOSIS — K57.30 DIVERTICULOSIS OF LARGE INTESTINE WITHOUT PERFORATION OR ABS: ICD-10-CM

## 2023-04-06 DIAGNOSIS — K29.70 GASTRITIS, UNSPECIFIED, WITHOUT BLEEDING: ICD-10-CM

## 2023-04-06 DIAGNOSIS — K31.89 OTHER DISEASES OF STOMACH AND DUODENUM: ICD-10-CM

## 2023-04-06 DIAGNOSIS — K44.9 DIAPHRAGMATIC HERNIA WITHOUT OBSTRUCTION OR GANGRENE: ICD-10-CM

## 2023-04-06 DIAGNOSIS — K62.1 RECTAL POLYP: ICD-10-CM

## 2023-04-06 DIAGNOSIS — K22.2 ESOPHAGEAL OBSTRUCTION: ICD-10-CM

## 2023-04-06 DIAGNOSIS — K21.9 GASTRO-ESOPHAGEAL REFLUX DISEASE WITHOUT ESOPHAGITIS: ICD-10-CM

## 2023-04-06 PROBLEM — K22.89 OTHER SPECIFIED DISEASE OF ESOPHAGUS: Status: ACTIVE | Noted: 2023-04-06

## 2023-04-06 PROBLEM — K63.89 OTHER SPECIFIED DISEASES OF INTESTINE: Status: ACTIVE | Noted: 2023-04-06

## 2023-04-06 LAB
GI HISTOLOGY: A. SELECT: (no result)
GI HISTOLOGY: B. UNSPECIFIED: (no result)
GI HISTOLOGY: C. UNSPECIFIED: (no result)
GI HISTOLOGY: D. UNSPECIFIED: (no result)
GI HISTOLOGY: E. UNSPECIFIED: (no result)
GI HISTOLOGY: F. UNSPECIFIED: (no result)
GI HISTOLOGY: PDF REPORT: (no result)

## 2023-04-06 PROCEDURE — 88305 TISSUE EXAM BY PATHOLOGIST: CPT | Performed by: INTERNAL MEDICINE

## 2023-04-06 PROCEDURE — 45380 COLONOSCOPY AND BIOPSY: CPT | Mod: 33 | Performed by: INTERNAL MEDICINE

## 2023-04-06 PROCEDURE — 88342 IMHCHEM/IMCYTCHM 1ST ANTB: CPT | Performed by: INTERNAL MEDICINE

## 2023-04-06 PROCEDURE — 43239 EGD BIOPSY SINGLE/MULTIPLE: CPT | Performed by: INTERNAL MEDICINE

## 2023-04-06 NOTE — SERVICEHPINOTES
DAYLIN HAJI  is a  52  female   who presents today for a  EGD-Colonoscopy   for   the indications listed below. The updated Patient Profile was reviewed prior to the procedure, in conjunction with the Physical Exam, including medical conditions, surgical procedures, medications, allergies, family history and social history. See Physical Exam time stamp below for date and time of HPI completion.Pre-operatively, I reviewed the indication(s) for the procedure, the risks of the procedure [including but not limited to: unexpected bleeding possibly requiring hospitalization and/or unplanned repeat procedures, perforation possibly requiring surgical treatment, missed lesions and complications of sedation/MAC (also explained by anesthesia staff)]. I have evaluated the patient for risks associated with the planned anesthesia and the procedure to be performed and find the patient an acceptable candidate for IV sedation.Multiple opportunities were provided for any questions or concerns, and all questions were answered satisfactorily before any anesthesia was administered. We will proceed with the planned procedure.br

## 2023-04-06 NOTE — SERVICENOTES
The patient was informed that this exam is not 100% accurate, depending on many factors (such as the preparation). Small lesions can be missed. Please call if symptoms persist or new symptoms develop. The right colon was examined twice. 
Procedure difficult due to looping, sharp angulation. Switched to endoscope and generalzed abdominal pressure required to advance to cecum.

## 2023-05-30 ENCOUNTER — HOSPITAL ENCOUNTER (OUTPATIENT)
Dept: CARDIOLOGY | Facility: HOSPITAL | Age: 53
Discharge: HOME | End: 2023-05-30

## 2023-05-30 ENCOUNTER — LAB (OUTPATIENT)
Dept: LAB | Facility: HOSPITAL | Age: 53
End: 2023-05-30

## 2023-05-30 ENCOUNTER — TRANSCRIBE ORDERS (OUTPATIENT)
Dept: ADMINISTRATIVE | Facility: HOSPITAL | Age: 53
End: 2023-05-30

## 2023-05-30 DIAGNOSIS — Z01.818 PRE-OP TESTING: ICD-10-CM

## 2023-05-30 DIAGNOSIS — Z01.818 PRE-OP TESTING: Primary | ICD-10-CM

## 2023-05-30 LAB
ALBUMIN SERPL-MCNC: 4.3 G/DL (ref 3.5–5.2)
ANION GAP SERPL CALCULATED.3IONS-SCNC: 10 MMOL/L (ref 5–15)
BASOPHILS # BLD AUTO: 0 10*3/MM3 (ref 0–0.2)
BASOPHILS NFR BLD AUTO: 0.5 % (ref 0–1.5)
BUN SERPL-MCNC: 10 MG/DL (ref 6–20)
BUN/CREAT SERPL: 15.2 (ref 7–25)
CALCIUM SPEC-SCNC: 8.8 MG/DL (ref 8.6–10.5)
CHLORIDE SERPL-SCNC: 103 MMOL/L (ref 98–107)
CO2 SERPL-SCNC: 24 MMOL/L (ref 22–29)
CREAT SERPL-MCNC: 0.66 MG/DL (ref 0.57–1)
DEPRECATED RDW RBC AUTO: 41.1 FL (ref 37–54)
EGFRCR SERPLBLD CKD-EPI 2021: 107 ML/MIN/1.73
EOSINOPHIL # BLD AUTO: 0.2 10*3/MM3 (ref 0–0.4)
EOSINOPHIL NFR BLD AUTO: 3 % (ref 0.3–6.2)
ERYTHROCYTE [DISTWIDTH] IN BLOOD BY AUTOMATED COUNT: 12.8 % (ref 12.3–15.4)
GLUCOSE SERPL-MCNC: 95 MG/DL (ref 65–99)
HBA1C MFR BLD: 5.5 % (ref 4.8–5.6)
HCT VFR BLD AUTO: 39.1 % (ref 34–46.6)
HGB BLD-MCNC: 12.9 G/DL (ref 12–15.9)
LYMPHOCYTES # BLD AUTO: 3 10*3/MM3 (ref 0.7–3.1)
LYMPHOCYTES NFR BLD AUTO: 42.9 % (ref 19.6–45.3)
MCH RBC QN AUTO: 30.6 PG (ref 26.6–33)
MCHC RBC AUTO-ENTMCNC: 33 G/DL (ref 31.5–35.7)
MCV RBC AUTO: 92.8 FL (ref 79–97)
MONOCYTES # BLD AUTO: 0.4 10*3/MM3 (ref 0.1–0.9)
MONOCYTES NFR BLD AUTO: 6 % (ref 5–12)
MRSA DNA SPEC QL NAA+PROBE: NORMAL
NEUTROPHILS NFR BLD AUTO: 3.3 10*3/MM3 (ref 1.7–7)
NEUTROPHILS NFR BLD AUTO: 47.6 % (ref 42.7–76)
NRBC BLD AUTO-RTO: 0 /100 WBC (ref 0–0.2)
PLATELET # BLD AUTO: 336 10*3/MM3 (ref 140–450)
PMV BLD AUTO: 8 FL (ref 6–12)
POTASSIUM SERPL-SCNC: 3.7 MMOL/L (ref 3.5–5.2)
QT INTERVAL: 418 MS
RBC # BLD AUTO: 4.21 10*6/MM3 (ref 3.77–5.28)
SODIUM SERPL-SCNC: 137 MMOL/L (ref 136–145)
WBC NRBC COR # BLD: 7 10*3/MM3 (ref 3.4–10.8)

## 2023-05-30 PROCEDURE — 93005 ELECTROCARDIOGRAM TRACING: CPT | Performed by: ORTHOPAEDIC SURGERY

## 2023-05-30 PROCEDURE — 87641 MR-STAPH DNA AMP PROBE: CPT

## 2023-05-30 PROCEDURE — 83036 HEMOGLOBIN GLYCOSYLATED A1C: CPT

## 2023-05-30 PROCEDURE — 85025 COMPLETE CBC W/AUTO DIFF WBC: CPT

## 2023-05-30 PROCEDURE — 80048 BASIC METABOLIC PNL TOTAL CA: CPT

## 2023-05-30 PROCEDURE — 82040 ASSAY OF SERUM ALBUMIN: CPT

## 2023-05-30 PROCEDURE — 36415 COLL VENOUS BLD VENIPUNCTURE: CPT

## 2023-07-11 LAB — QT INTERVAL: 418 MS

## 2024-03-22 ENCOUNTER — HOSPITAL ENCOUNTER (EMERGENCY)
Facility: HOSPITAL | Age: 54
Discharge: HOME | End: 2024-03-22
Attending: EMERGENCY MEDICINE | Admitting: INTERNAL MEDICINE
Payer: COMMERCIAL

## 2024-03-22 ENCOUNTER — APPOINTMENT (OUTPATIENT)
Dept: CT IMAGING | Facility: HOSPITAL | Age: 54
End: 2024-03-22
Payer: COMMERCIAL

## 2024-03-22 VITALS
HEIGHT: 64 IN | OXYGEN SATURATION: 97 % | DIASTOLIC BLOOD PRESSURE: 80 MMHG | RESPIRATION RATE: 18 BRPM | SYSTOLIC BLOOD PRESSURE: 124 MMHG | BODY MASS INDEX: 35 KG/M2 | WEIGHT: 205 LBS | HEART RATE: 60 BPM | TEMPERATURE: 98 F

## 2024-03-22 DIAGNOSIS — R10.11 RIGHT UPPER QUADRANT ABDOMINAL PAIN: Primary | ICD-10-CM

## 2024-03-22 LAB
ALBUMIN SERPL-MCNC: 4.8 G/DL (ref 3.5–5.2)
ALBUMIN/GLOB SERPL: 1.8 G/DL
ALP SERPL-CCNC: 66 U/L (ref 39–117)
ALT SERPL W P-5'-P-CCNC: 16 U/L (ref 1–33)
ANION GAP SERPL CALCULATED.3IONS-SCNC: 12 MMOL/L (ref 5–15)
AST SERPL-CCNC: 15 U/L (ref 1–32)
BASOPHILS # BLD AUTO: 0.04 10*3/MM3 (ref 0–0.2)
BASOPHILS NFR BLD AUTO: 0.4 % (ref 0–1.5)
BILIRUB SERPL-MCNC: 0.3 MG/DL (ref 0–1.2)
BILIRUB UR QL STRIP: NEGATIVE
BUN SERPL-MCNC: 16 MG/DL (ref 6–20)
BUN/CREAT SERPL: 22.5 (ref 7–25)
CALCIUM SPEC-SCNC: 9.6 MG/DL (ref 8.6–10.5)
CHLORIDE SERPL-SCNC: 103 MMOL/L (ref 98–107)
CLARITY UR: CLEAR
CO2 SERPL-SCNC: 26 MMOL/L (ref 22–29)
COLOR UR: YELLOW
CREAT SERPL-MCNC: 0.71 MG/DL (ref 0.57–1)
DEPRECATED RDW RBC AUTO: 42.9 FL (ref 37–54)
EGFRCR SERPLBLD CKD-EPI 2021: 103.1 ML/MIN/1.73
EOSINOPHIL # BLD AUTO: 0.21 10*3/MM3 (ref 0–0.4)
EOSINOPHIL NFR BLD AUTO: 2 % (ref 0.3–6.2)
ERYTHROCYTE [DISTWIDTH] IN BLOOD BY AUTOMATED COUNT: 12.6 % (ref 12.3–15.4)
GLOBULIN UR ELPH-MCNC: 2.7 GM/DL
GLUCOSE SERPL-MCNC: 99 MG/DL (ref 65–99)
GLUCOSE UR STRIP-MCNC: NEGATIVE MG/DL
HCT VFR BLD AUTO: 40.8 % (ref 34–46.6)
HGB BLD-MCNC: 13.3 G/DL (ref 12–15.9)
HGB UR QL STRIP.AUTO: NEGATIVE
IMM GRANULOCYTES # BLD AUTO: 0.03 10*3/MM3 (ref 0–0.05)
IMM GRANULOCYTES NFR BLD AUTO: 0.3 % (ref 0–0.5)
KETONES UR QL STRIP: ABNORMAL
LEUKOCYTE ESTERASE UR QL STRIP.AUTO: NEGATIVE
LIPASE SERPL-CCNC: 41 U/L (ref 13–60)
LYMPHOCYTES # BLD AUTO: 3.66 10*3/MM3 (ref 0.7–3.1)
LYMPHOCYTES NFR BLD AUTO: 35.4 % (ref 19.6–45.3)
MCH RBC QN AUTO: 30 PG (ref 26.6–33)
MCHC RBC AUTO-ENTMCNC: 32.6 G/DL (ref 31.5–35.7)
MCV RBC AUTO: 92.1 FL (ref 79–97)
MONOCYTES # BLD AUTO: 0.71 10*3/MM3 (ref 0.1–0.9)
MONOCYTES NFR BLD AUTO: 6.9 % (ref 5–12)
NEUTROPHILS NFR BLD AUTO: 5.69 10*3/MM3 (ref 1.7–7)
NEUTROPHILS NFR BLD AUTO: 55 % (ref 42.7–76)
NITRITE UR QL STRIP: NEGATIVE
NRBC BLD AUTO-RTO: 0 /100 WBC (ref 0–0.2)
PH UR STRIP.AUTO: 5.5 [PH] (ref 5–8)
PLATELET # BLD AUTO: 362 10*3/MM3 (ref 140–450)
PMV BLD AUTO: 9.6 FL (ref 6–12)
POTASSIUM SERPL-SCNC: 4.3 MMOL/L (ref 3.5–5.2)
PROT SERPL-MCNC: 7.5 G/DL (ref 6–8.5)
PROT UR QL STRIP: NEGATIVE
RBC # BLD AUTO: 4.43 10*6/MM3 (ref 3.77–5.28)
SODIUM SERPL-SCNC: 141 MMOL/L (ref 136–145)
SP GR UR STRIP: 1.02 (ref 1–1.03)
UROBILINOGEN UR QL STRIP: ABNORMAL
WBC NRBC COR # BLD AUTO: 10.34 10*3/MM3 (ref 3.4–10.8)

## 2024-03-22 PROCEDURE — 25810000003 LACTATED RINGERS SOLUTION: Performed by: EMERGENCY MEDICINE

## 2024-03-22 PROCEDURE — 96374 THER/PROPH/DIAG INJ IV PUSH: CPT

## 2024-03-22 PROCEDURE — 80053 COMPREHEN METABOLIC PANEL: CPT | Performed by: EMERGENCY MEDICINE

## 2024-03-22 PROCEDURE — 96375 TX/PRO/DX INJ NEW DRUG ADDON: CPT

## 2024-03-22 PROCEDURE — 25510000001 IOPAMIDOL PER 1 ML: Performed by: EMERGENCY MEDICINE

## 2024-03-22 PROCEDURE — 25010000002 ONDANSETRON PER 1 MG: Performed by: EMERGENCY MEDICINE

## 2024-03-22 PROCEDURE — 85025 COMPLETE CBC W/AUTO DIFF WBC: CPT | Performed by: EMERGENCY MEDICINE

## 2024-03-22 PROCEDURE — 83690 ASSAY OF LIPASE: CPT | Performed by: EMERGENCY MEDICINE

## 2024-03-22 PROCEDURE — 81003 URINALYSIS AUTO W/O SCOPE: CPT | Performed by: EMERGENCY MEDICINE

## 2024-03-22 PROCEDURE — 99285 EMERGENCY DEPT VISIT HI MDM: CPT

## 2024-03-22 PROCEDURE — 25010000002 HYDROMORPHONE 1 MG/ML SOLUTION: Performed by: EMERGENCY MEDICINE

## 2024-03-22 PROCEDURE — 74177 CT ABD & PELVIS W/CONTRAST: CPT

## 2024-03-22 RX ORDER — SODIUM CHLORIDE 0.9 % (FLUSH) 0.9 %
10 SYRINGE (ML) INJECTION AS NEEDED
Status: DISCONTINUED | OUTPATIENT
Start: 2024-03-22 | End: 2024-03-22 | Stop reason: HOSPADM

## 2024-03-22 RX ORDER — SODIUM CHLORIDE 9 MG/ML
125 INJECTION, SOLUTION INTRAVENOUS CONTINUOUS
Status: DISCONTINUED | OUTPATIENT
Start: 2024-03-22 | End: 2024-03-22 | Stop reason: HOSPADM

## 2024-03-22 RX ORDER — ONDANSETRON 2 MG/ML
4 INJECTION INTRAMUSCULAR; INTRAVENOUS ONCE
Status: COMPLETED | OUTPATIENT
Start: 2024-03-22 | End: 2024-03-22

## 2024-03-22 RX ADMIN — ONDANSETRON 4 MG: 2 INJECTION INTRAMUSCULAR; INTRAVENOUS at 17:24

## 2024-03-22 RX ADMIN — HYDROMORPHONE HYDROCHLORIDE 0.5 MG: 1 INJECTION, SOLUTION INTRAMUSCULAR; INTRAVENOUS; SUBCUTANEOUS at 17:25

## 2024-03-22 RX ADMIN — SODIUM CHLORIDE, POTASSIUM CHLORIDE, SODIUM LACTATE AND CALCIUM CHLORIDE 1000 ML: 600; 310; 30; 20 INJECTION, SOLUTION INTRAVENOUS at 17:23

## 2024-03-22 RX ADMIN — IOPAMIDOL 100 ML: 755 INJECTION, SOLUTION INTRAVENOUS at 19:14

## 2024-03-22 NOTE — Clinical Note
Level of Care: Med/Surg [1]   Admitting Physician: DICK MOORE [[id number]]   Attending Physician: DICK MOORE [[id number]]

## 2024-03-23 NOTE — ED PROVIDER NOTES
Subjective   History of Present Illness  Chief complaint pain in right upper quadrant    History of present illness a 53-year-old female has had some intermittent pain to the right upper abdomen for about 2 weeks comes and goes but tonight is severe in nature.  Is been there for a couple hours not going away radiates into her back is worse with movement there is no chest pain or shortness of breath no neck arm or jaw pain dizziness or syncope.  No foreign travels leg pain or swelling no recent long car ride plane ride immobilization.  Nothing seem to trigger make it better or worse who does not necessarily affected although she has been nauseous with some decreased appetite no black or bloody stools no urinary complaints.   states he called the office to try to get her into the office but apparently redo the computer system and they were not taking new appointments.      Review of Systems   Constitutional:  Negative for chills and fever.   Respiratory:  Negative for cough, chest tightness and shortness of breath.    Cardiovascular:  Negative for chest pain and leg swelling.   Gastrointestinal:  Positive for abdominal pain. Negative for vomiting.   Genitourinary:  Negative for difficulty urinating and flank pain.   Musculoskeletal:  Positive for back pain.   Neurological:  Negative for dizziness and light-headedness.   Psychiatric/Behavioral:  Negative for confusion.        Past Medical History:   Diagnosis Date    GERD (gastroesophageal reflux disease)     Obesity        Allergies   Allergen Reactions    Percocet [Oxycodone-Acetaminophen] Rash       Past Surgical History:   Procedure Laterality Date    PARTIAL KNEE ARTHROPLASTY      TUBAL ABDOMINAL LIGATION         Family History   Problem Relation Age of Onset    Heart disease Father        Social History     Socioeconomic History    Marital status:    Tobacco Use    Smoking status: Former    Smokeless tobacco: Never   Vaping Use    Vaping status:  Never Used   Substance and Sexual Activity    Alcohol use: Yes     Comment: socially    Drug use: Never    Sexual activity: Defer     Prior to Admission medications    Medication Sig Start Date End Date Taking? Authorizing Provider   Chlorcyclizine-Pseudoephed 25-60 MG tablet Take 1 tablet by mouth Every 8 (Eight) Hours As Needed (congestion). 11/23/21   Santo Baca APRN   esomeprazole (nexIUM) 20 MG capsule Take 40 mg by mouth Every Morning Before Breakfast.    Emergency, Nurse Epic, RN          Objective   Physical Exam  Constitutional is a 53-year-old female awake alert in pain but nontoxic-appearing triage vital signs reviewed.  HEENT extraocular muscles intact sclera clear neck supple no adenopathy no JVD no bruits lungs clear no retraction back no CVA tenderness no spinal tenderness abdomen soft she has significant tenderness right upper quadrant to palpation.  No rebound no guarding good bowel sounds no peritoneal findings or pulsatile masses positive Suggs sign no skin changes redness swelling or rash extremities pulses equal upper lower extremities no edema cords or Homans' sign no evidence of DVT skin warm dry without rashes neurologic awake alert and follows commands without focal weakness  Procedures           ED Course      Results for orders placed or performed during the hospital encounter of 03/22/24   Comprehensive Metabolic Panel    Specimen: Blood   Result Value Ref Range    Glucose 99 65 - 99 mg/dL    BUN 16 6 - 20 mg/dL    Creatinine 0.71 0.57 - 1.00 mg/dL    Sodium 141 136 - 145 mmol/L    Potassium 4.3 3.5 - 5.2 mmol/L    Chloride 103 98 - 107 mmol/L    CO2 26.0 22.0 - 29.0 mmol/L    Calcium 9.6 8.6 - 10.5 mg/dL    Total Protein 7.5 6.0 - 8.5 g/dL    Albumin 4.8 3.5 - 5.2 g/dL    ALT (SGPT) 16 1 - 33 U/L    AST (SGOT) 15 1 - 32 U/L    Alkaline Phosphatase 66 39 - 117 U/L    Total Bilirubin 0.3 0.0 - 1.2 mg/dL    Globulin 2.7 gm/dL    A/G Ratio 1.8 g/dL    BUN/Creatinine Ratio 22.5 7.0  - 25.0    Anion Gap 12.0 5.0 - 15.0 mmol/L    eGFR 103.1 >60.0 mL/min/1.73   Lipase    Specimen: Blood   Result Value Ref Range    Lipase 41 13 - 60 U/L   Urinalysis With Microscopic If Indicated (No Culture) - Urine, Clean Catch    Specimen: Urine, Clean Catch   Result Value Ref Range    Color, UA Yellow Yellow, Straw    Appearance, UA Clear Clear    pH, UA 5.5 5.0 - 8.0    Specific Gravity, UA 1.021 1.005 - 1.030    Glucose, UA Negative Negative    Ketones, UA Trace (A) Negative    Bilirubin, UA Negative Negative    Blood, UA Negative Negative    Protein, UA Negative Negative    Leuk Esterase, UA Negative Negative    Nitrite, UA Negative Negative    Urobilinogen, UA 0.2 E.U./dL 0.2 - 1.0 E.U./dL   CBC Auto Differential    Specimen: Blood   Result Value Ref Range    WBC 10.34 3.40 - 10.80 10*3/mm3    RBC 4.43 3.77 - 5.28 10*6/mm3    Hemoglobin 13.3 12.0 - 15.9 g/dL    Hematocrit 40.8 34.0 - 46.6 %    MCV 92.1 79.0 - 97.0 fL    MCH 30.0 26.6 - 33.0 pg    MCHC 32.6 31.5 - 35.7 g/dL    RDW 12.6 12.3 - 15.4 %    RDW-SD 42.9 37.0 - 54.0 fl    MPV 9.6 6.0 - 12.0 fL    Platelets 362 140 - 450 10*3/mm3    Neutrophil % 55.0 42.7 - 76.0 %    Lymphocyte % 35.4 19.6 - 45.3 %    Monocyte % 6.9 5.0 - 12.0 %    Eosinophil % 2.0 0.3 - 6.2 %    Basophil % 0.4 0.0 - 1.5 %    Immature Grans % 0.3 0.0 - 0.5 %    Neutrophils, Absolute 5.69 1.70 - 7.00 10*3/mm3    Lymphocytes, Absolute 3.66 (H) 0.70 - 3.10 10*3/mm3    Monocytes, Absolute 0.71 0.10 - 0.90 10*3/mm3    Eosinophils, Absolute 0.21 0.00 - 0.40 10*3/mm3    Basophils, Absolute 0.04 0.00 - 0.20 10*3/mm3    Immature Grans, Absolute 0.03 0.00 - 0.05 10*3/mm3    nRBC 0.0 0.0 - 0.2 /100 WBC     CT Abdomen Pelvis With Contrast    Result Date: 3/22/2024  Impression: 1. No acute findings within the abdomen or pelvis. 2. Colonic diverticulosis. 3. 1.2 x 0.6 cm indeterminate left lower lobe pulmonary nodule. I would recommend a CT of the chest on a nonemergent basis. Electronically  Signed: Audie Arvizu MD  3/22/2024 7:29 PM EDT  Workstation ID: [id number]   Medications   sodium chloride 0.9 % flush 10 mL (has no administration in time range)   lactated ringers bolus 1,000 mL (0 mL Intravenous Stopped 3/22/24 1804)   HYDROmorphone (DILAUDID) injection 0.5 mg (0.5 mg Intravenous Given 3/22/24 1725)   ondansetron (ZOFRAN) injection 4 mg (4 mg Intravenous Given 3/22/24 1724)   iopamidol (ISOVUE-370) 76 % injection 100 mL (100 mL Intravenous Given 3/22/24 1914)                                              Medical Decision Making  Medical decision making.  IV established monitor placement review of sinus rhythm patient given Dilaudid 0.5 mg IV Zofran 4 mg IV and a liter lactated Ringer's.  Labs obtained my independent review comprehensive metabolic profile liver lipase CBC normal normal.  CT abdomen pelvis obtained my independent review I do not see pneumonia I do not see any evidence that would suggest acute cholecystitis appendicitis diverticulitis ischemic bowel bowel obstruction aneurysm or dissection or any lower lung pathology.  Radiology review of diverticulosis without diverticulitis small indeterminate 1.2 x 0.6 cm indeterminate left lower lobe pulmonary nodule.  The patient repeat exam was feeling much better her pain resolved abdomen soft nontender good bowel sounds no peritoneal findings or pulsatile masses.  We talked about the findings.  My suspicion is the patient had a gallbladder issue probably needs an ultrasound and a HIDA scan.  I do not see evidence that suggest acute appendicitis or acute cholecystitis or ischemic bowel DVT pulmonary embolism acute cardiac event such as myocardial infarction pericarditis myocarditis pericardial effusion aneurysm or dissection although not a complete list of all possibilities patient initially was agreeable to stay in the hospital to have the workup in the morning because of the significance of her pain she was having.  But then later changed her  mind states she wants to go home.  We talked about what to return for she voiced understanding was discharged home for outpatient management follow-up.    Problems Addressed:  Right upper quadrant abdominal pain: complicated acute illness or injury    Amount and/or Complexity of Data Reviewed  Labs: ordered. Decision-making details documented in ED Course.  Radiology: ordered and independent interpretation performed. Decision-making details documented in ED Course.    Risk  Parenteral controlled substances.  Decision regarding hospitalization.        Final diagnoses:   Right upper quadrant abdominal pain       ED Disposition  ED Disposition       ED Disposition   Decision to Admit    Condition   --    Comment   Level of Care: Med/Surg [1]   Admitting Physician: DICK MOORE [[id number]]   Attending Physician: DICK MOORE [[id number]]                 No follow-up provider specified.       Medication List      No changes were made to your prescriptions during this visit.            Jose Carlos Mcallister MD  03/22/24 [id number]

## 2024-03-23 NOTE — DISCHARGE INSTRUCTIONS
[place] diet no greasy fried fatty spicy food or caffeine.  Return for increasing pain recurrence pain fever vomiting vomiting blood black or bloody stools chest pain neck arm jaw pain coughing up blood shortness of breath black or bloody stool or any other new or worse problems or concerns return immediately ER.  You have a small left-sided pulmonary nodule you will need a chest CT arranged by your primary care provider in the next few months.

## 2025-06-16 ENCOUNTER — APPOINTMENT (OUTPATIENT)
Dept: CT IMAGING | Facility: HOSPITAL | Age: 55
DRG: 418 | End: 2025-06-16
Payer: COMMERCIAL

## 2025-06-16 ENCOUNTER — HOSPITAL ENCOUNTER (INPATIENT)
Facility: HOSPITAL | Age: 55
LOS: 1 days | Discharge: HOME | DRG: 418 | End: 2025-06-19
Attending: EMERGENCY MEDICINE | Admitting: INTERNAL MEDICINE
Payer: COMMERCIAL

## 2025-06-16 ENCOUNTER — APPOINTMENT (OUTPATIENT)
Dept: ULTRASOUND IMAGING | Facility: HOSPITAL | Age: 55
DRG: 418 | End: 2025-06-16
Payer: COMMERCIAL

## 2025-06-16 DIAGNOSIS — R10.13 EPIGASTRIC ABDOMINAL PAIN: ICD-10-CM

## 2025-06-16 DIAGNOSIS — R11.2 NAUSEA AND VOMITING, UNSPECIFIED VOMITING TYPE: ICD-10-CM

## 2025-06-16 DIAGNOSIS — K85.10 GALLSTONE PANCREATITIS: ICD-10-CM

## 2025-06-16 DIAGNOSIS — K81.9 CHOLECYSTITIS: ICD-10-CM

## 2025-06-16 DIAGNOSIS — Z90.49 S/P LAPAROSCOPIC CHOLECYSTECTOMY: ICD-10-CM

## 2025-06-16 DIAGNOSIS — K85.90 ACUTE PANCREATITIS, UNSPECIFIED COMPLICATION STATUS, UNSPECIFIED PANCREATITIS TYPE: Primary | ICD-10-CM

## 2025-06-16 DIAGNOSIS — R10.9 ABDOMINAL PAIN, UNSPECIFIED ABDOMINAL LOCATION: ICD-10-CM

## 2025-06-16 LAB
ALBUMIN SERPL-MCNC: 4.4 G/DL (ref 3.5–5.2)
ALBUMIN/GLOB SERPL: 1.9 G/DL
ALP SERPL-CCNC: 141 U/L (ref 39–117)
ALT SERPL W P-5'-P-CCNC: 158 U/L (ref 1–33)
ANION GAP SERPL CALCULATED.3IONS-SCNC: 12.4 MMOL/L (ref 5–15)
AST SERPL-CCNC: 70 U/L (ref 1–32)
BACTERIA UR QL AUTO: ABNORMAL /HPF
BASOPHILS # BLD AUTO: 0.03 10*3/MM3 (ref 0–0.2)
BASOPHILS NFR BLD AUTO: 0.4 % (ref 0–1.5)
BILIRUB SERPL-MCNC: 0.6 MG/DL (ref 0–1.2)
BILIRUB UR QL STRIP: NEGATIVE
BUN SERPL-MCNC: 11.7 MG/DL (ref 6–20)
BUN/CREAT SERPL: 20.5 (ref 7–25)
CALCIUM SPEC-SCNC: 9.1 MG/DL (ref 8.6–10.5)
CHLORIDE SERPL-SCNC: 105 MMOL/L (ref 98–107)
CLARITY UR: ABNORMAL
CO2 SERPL-SCNC: 20.6 MMOL/L (ref 22–29)
COLOR UR: ABNORMAL
CREAT SERPL-MCNC: 0.57 MG/DL (ref 0.57–1)
DEPRECATED RDW RBC AUTO: 42.4 FL (ref 37–54)
EGFRCR SERPLBLD CKD-EPI 2021: 109.5 ML/MIN/1.73
EOSINOPHIL # BLD AUTO: 0.29 10*3/MM3 (ref 0–0.4)
EOSINOPHIL NFR BLD AUTO: 3.8 % (ref 0.3–6.2)
ERYTHROCYTE [DISTWIDTH] IN BLOOD BY AUTOMATED COUNT: 12.5 % (ref 12.3–15.4)
FLUAV RNA RESP QL NAA+PROBE: NOT DETECTED
FLUBV RNA RESP QL NAA+PROBE: NOT DETECTED
GLOBULIN UR ELPH-MCNC: 2.3 GM/DL
GLUCOSE SERPL-MCNC: 94 MG/DL (ref 65–99)
GLUCOSE UR STRIP-MCNC: NEGATIVE MG/DL
HCT VFR BLD AUTO: 38.8 % (ref 34–46.6)
HGB BLD-MCNC: 13.1 G/DL (ref 12–15.9)
HGB UR QL STRIP.AUTO: ABNORMAL
HOLD SPECIMEN: NORMAL
HYALINE CASTS UR QL AUTO: ABNORMAL /LPF
IMM GRANULOCYTES # BLD AUTO: 0.03 10*3/MM3 (ref 0–0.05)
IMM GRANULOCYTES NFR BLD AUTO: 0.4 % (ref 0–0.5)
KETONES UR QL STRIP: ABNORMAL
LEUKOCYTE ESTERASE UR QL STRIP.AUTO: ABNORMAL
LIPASE SERPL-CCNC: 480 U/L (ref 13–60)
LYMPHOCYTES # BLD AUTO: 2.71 10*3/MM3 (ref 0.7–3.1)
LYMPHOCYTES NFR BLD AUTO: 35.8 % (ref 19.6–45.3)
MCH RBC QN AUTO: 31.1 PG (ref 26.6–33)
MCHC RBC AUTO-ENTMCNC: 33.8 G/DL (ref 31.5–35.7)
MCV RBC AUTO: 92.2 FL (ref 79–97)
MONOCYTES # BLD AUTO: 0.56 10*3/MM3 (ref 0.1–0.9)
MONOCYTES NFR BLD AUTO: 7.4 % (ref 5–12)
NEUTROPHILS NFR BLD AUTO: 3.95 10*3/MM3 (ref 1.7–7)
NEUTROPHILS NFR BLD AUTO: 52.2 % (ref 42.7–76)
NITRITE UR QL STRIP: NEGATIVE
NRBC BLD AUTO-RTO: 0 /100 WBC (ref 0–0.2)
PH UR STRIP.AUTO: 6 [PH] (ref 5–8)
PLAT MORPH BLD: NORMAL
PLATELET # BLD AUTO: 264 10*3/MM3 (ref 140–450)
PMV BLD AUTO: 10.5 FL (ref 6–12)
POTASSIUM SERPL-SCNC: 3.8 MMOL/L (ref 3.5–5.2)
PROT SERPL-MCNC: 6.7 G/DL (ref 6–8.5)
PROT UR QL STRIP: NEGATIVE
RBC # BLD AUTO: 4.21 10*6/MM3 (ref 3.77–5.28)
RBC # UR STRIP: ABNORMAL /HPF
RBC MORPH BLD: NORMAL
REF LAB TEST METHOD: ABNORMAL
RSV RNA RESP QL NAA+PROBE: NOT DETECTED
SARS-COV-2 RNA RESP QL NAA+PROBE: NOT DETECTED
SODIUM SERPL-SCNC: 138 MMOL/L (ref 136–145)
SP GR UR STRIP: 1.02 (ref 1–1.03)
SQUAMOUS #/AREA URNS HPF: ABNORMAL /HPF
UROBILINOGEN UR QL STRIP: ABNORMAL
WBC # UR STRIP: ABNORMAL /HPF
WBC MORPH BLD: NORMAL
WBC NRBC COR # BLD AUTO: 7.57 10*3/MM3 (ref 3.4–10.8)
WHOLE BLOOD HOLD COAG: NORMAL
WHOLE BLOOD HOLD SPECIMEN: NORMAL

## 2025-06-16 PROCEDURE — 83036 HEMOGLOBIN GLYCOSYLATED A1C: CPT

## 2025-06-16 PROCEDURE — 81001 URINALYSIS AUTO W/SCOPE: CPT

## 2025-06-16 PROCEDURE — G0378 HOSPITAL OBSERVATION PER HR: HCPCS

## 2025-06-16 PROCEDURE — 80053 COMPREHEN METABOLIC PANEL: CPT

## 2025-06-16 PROCEDURE — 87637 SARSCOV2&INF A&B&RSV AMP PRB: CPT | Performed by: INTERNAL MEDICINE

## 2025-06-16 PROCEDURE — 36415 COLL VENOUS BLD VENIPUNCTURE: CPT

## 2025-06-16 PROCEDURE — 83690 ASSAY OF LIPASE: CPT

## 2025-06-16 PROCEDURE — 25010000002 HYDROMORPHONE PER 4 MG

## 2025-06-16 PROCEDURE — 25510000001 IOPAMIDOL PER 1 ML

## 2025-06-16 PROCEDURE — 76705 ECHO EXAM OF ABDOMEN: CPT

## 2025-06-16 PROCEDURE — 74177 CT ABD & PELVIS W/CONTRAST: CPT

## 2025-06-16 PROCEDURE — 25010000002 ONDANSETRON PER 1 MG

## 2025-06-16 PROCEDURE — 99285 EMERGENCY DEPT VISIT HI MDM: CPT

## 2025-06-16 PROCEDURE — 25810000003 SODIUM CHLORIDE 0.9 % SOLUTION

## 2025-06-16 PROCEDURE — 85007 BL SMEAR W/DIFF WBC COUNT: CPT

## 2025-06-16 PROCEDURE — 25010000002 CEFTRIAXONE PER 250 MG

## 2025-06-16 PROCEDURE — 25010000002 METRONIDAZOLE 500 MG/100ML SOLUTION

## 2025-06-16 PROCEDURE — 85025 COMPLETE CBC W/AUTO DIFF WBC: CPT

## 2025-06-16 PROCEDURE — 82105 ALPHA-FETOPROTEIN SERUM: CPT

## 2025-06-16 RX ORDER — IOPAMIDOL 755 MG/ML
100 INJECTION, SOLUTION INTRAVASCULAR
Status: COMPLETED | OUTPATIENT
Start: 2025-06-16 | End: 2025-06-16

## 2025-06-16 RX ORDER — MELOXICAM 15 MG/1
15 TABLET ORAL DAILY PRN
COMMUNITY
Start: 2025-05-05 | End: 2025-06-19 | Stop reason: HOSPADM

## 2025-06-16 RX ORDER — HYDROMORPHONE HYDROCHLORIDE 1 MG/ML
0.5 INJECTION, SOLUTION INTRAMUSCULAR; INTRAVENOUS; SUBCUTANEOUS ONCE
Refills: 0 | Status: COMPLETED | OUTPATIENT
Start: 2025-06-16 | End: 2025-06-16

## 2025-06-16 RX ORDER — ONDANSETRON 2 MG/ML
4 INJECTION INTRAMUSCULAR; INTRAVENOUS ONCE
Status: COMPLETED | OUTPATIENT
Start: 2025-06-16 | End: 2025-06-16

## 2025-06-16 RX ORDER — LOSARTAN POTASSIUM 50 MG/1
50 TABLET ORAL DAILY
COMMUNITY
Start: 2025-05-22 | End: 2025-06-19 | Stop reason: HOSPADM

## 2025-06-16 RX ORDER — OMEPRAZOLE 40 MG/1
40 CAPSULE, DELAYED RELEASE ORAL DAILY
COMMUNITY
End: 2025-06-19 | Stop reason: HOSPADM

## 2025-06-16 RX ORDER — CYCLOBENZAPRINE HCL 10 MG
10 TABLET ORAL 3 TIMES DAILY PRN
COMMUNITY
Start: 2025-05-06 | End: 2025-06-19 | Stop reason: HOSPADM

## 2025-06-16 RX ORDER — SODIUM CHLORIDE 0.9 % (FLUSH) 0.9 %
10 SYRINGE (ML) INJECTION AS NEEDED
Status: DISCONTINUED | OUTPATIENT
Start: 2025-06-16 | End: 2025-06-19 | Stop reason: HOSPADM

## 2025-06-16 RX ORDER — SODIUM CHLORIDE, SODIUM LACTATE, POTASSIUM CHLORIDE, CALCIUM CHLORIDE 600; 310; 30; 20 MG/100ML; MG/100ML; MG/100ML; MG/100ML
75 INJECTION, SOLUTION INTRAVENOUS CONTINUOUS
Status: DISCONTINUED | OUTPATIENT
Start: 2025-06-16 | End: 2025-06-17

## 2025-06-16 RX ORDER — METRONIDAZOLE 500 MG/100ML
500 INJECTION, SOLUTION INTRAVENOUS ONCE
Status: COMPLETED | OUTPATIENT
Start: 2025-06-16 | End: 2025-06-16

## 2025-06-16 RX ORDER — DULOXETINE 40 MG/1
40 CAPSULE, DELAYED RELEASE ORAL DAILY
COMMUNITY
Start: 2025-06-12 | End: 2025-06-19 | Stop reason: HOSPADM

## 2025-06-16 RX ORDER — IBUPROFEN 200 MG
400 TABLET ORAL EVERY 6 HOURS PRN
COMMUNITY
End: 2025-06-19 | Stop reason: HOSPADM

## 2025-06-16 RX ADMIN — ONDANSETRON 4 MG: 2 INJECTION, SOLUTION INTRAMUSCULAR; INTRAVENOUS at 16:06

## 2025-06-16 RX ADMIN — IOPAMIDOL 100 ML: 755 INJECTION, SOLUTION INTRAVENOUS at 17:03

## 2025-06-16 RX ADMIN — METRONIDAZOLE 500 MG: 500 INJECTION, SOLUTION INTRAVENOUS at 20:13

## 2025-06-16 RX ADMIN — HYDROMORPHONE HYDROCHLORIDE 0.5 MG: 1 INJECTION, SOLUTION INTRAMUSCULAR; INTRAVENOUS; SUBCUTANEOUS at 16:07

## 2025-06-16 RX ADMIN — SODIUM CHLORIDE 1000 ML: 9 INJECTION, SOLUTION INTRAVENOUS at 18:10

## 2025-06-16 RX ADMIN — CEFTRIAXONE 2000 MG: 2 INJECTION, POWDER, FOR SOLUTION INTRAMUSCULAR; INTRAVENOUS at 18:10

## 2025-06-16 NOTE — Clinical Note
Level of Care: Med/Surg [1]   Admitting Physician: LISETH MANNING [[id number]]   Attending Physician: DICK MOORE [[id number]]

## 2025-06-16 NOTE — ED NOTES
Presents to the ED with C/O abdominal discomfort with nausea since Saturday. Reports this occurs every few months. Pt denies diarrhea. Reports vomiting x 2.

## 2025-06-16 NOTE — ED PROVIDER NOTES
Subjective     Chief Complaint   Patient presents with    Abdominal Pain     Abd pain, upper abd  radiates to back started on Saturday, Pt has some nausea and vomiting and dark urine.       History of Present Illness  Chief complaint: Abdominal pain    Context: Patient is a 54-year-old female with a history of GERD who presents to the emergency department from appointment with Dr. Damon with complaints of upper abdominal pain consistently since Saturday.  Patient states she has had intermittent abdominal pain for last 3-4 months, and states that since Saturday the pain has been significantly worse and consistent.  Patient also states nausea, vomiting, and tea-colored urine all since then.  Patient denies any significant change to bowel habits; states mild constipation over the weekend but no blood in stool or diarrhea.  Patient denies recent illness and denies other symptoms including chest pain and shortness of breath at this time.    PCP: Karin Briseno MD    LNMP: Postmenopausal        Review of Systems   Gastrointestinal:  Positive for abdominal pain, nausea and vomiting.       Past Medical History:   Diagnosis Date    GERD (gastroesophageal reflux disease)     Obesity        Allergies   Allergen Reactions    Percocet [Oxycodone-Acetaminophen] Rash       Past Surgical History:   Procedure Laterality Date    PARTIAL KNEE ARTHROPLASTY      TUBAL ABDOMINAL LIGATION         Family History   Problem Relation Age of Onset    Heart disease Father        Social History     Socioeconomic History    Marital status:    Tobacco Use    Smoking status: Former    Smokeless tobacco: Never   Vaping Use    Vaping status: Never Used   Substance and Sexual Activity    Alcohol use: Yes     Comment: socially    Drug use: Never    Sexual activity: Defer           Objective   Physical Exam  Vitals and nursing note reviewed.   Constitutional:       General: She is not in acute distress.     Appearance: She is obese. She is  "not ill-appearing or toxic-appearing.   HENT:      Head: Normocephalic and atraumatic.   Eyes:      Extraocular Movements: Extraocular movements intact.      Pupils: Pupils are equal, round, and reactive to light.   Cardiovascular:      Rate and Rhythm: Normal rate and regular rhythm.      Heart sounds: Normal heart sounds.   Pulmonary:      Effort: Pulmonary effort is normal. No respiratory distress.      Breath sounds: Normal breath sounds. No stridor. No wheezing, rhonchi or rales.   Abdominal:      General: Bowel sounds are normal. There is no distension.      Palpations: Abdomen is soft. There is no hepatomegaly.      Tenderness: There is abdominal tenderness in the right upper quadrant and epigastric area. There is right CVA tenderness and left CVA tenderness. Positive signs include Suggs's sign. Negative signs include Rovsing's sign and McBurney's sign.   Skin:     General: Skin is warm and dry.      Capillary Refill: Capillary refill takes less than 2 seconds.      Coloration: Skin is not cyanotic or jaundiced.   Neurological:      General: No focal deficit present.      Mental Status: She is alert and oriented to person, place, and time.   Psychiatric:         Mood and Affect: Mood normal.         Behavior: Behavior normal.         Procedures           ED Course  ED Course as of 06/16/25 2234 Mon Jun 16, 2025   1613 Urine results reviewed; waiting for CT to be completed. [RS]   1644 Blood work reviewed; waiting for CT. [RS]      ED Course User Index  [RS] Herve Hughes PA-C      /66   Pulse 56   Temp 97.7 °F (36.5 °C) (Oral)   Resp 20   Ht 162.6 cm (64\")   Wt 88.5 kg (195 lb 1.7 oz)   SpO2 98%   BMI 33.49 kg/m²   Labs Reviewed   COMPREHENSIVE METABOLIC PANEL - Abnormal; Notable for the following components:       Result Value    CO2 20.6 (*)     ALT (SGPT) 158 (*)     AST (SGOT) 70 (*)     Alkaline Phosphatase 141 (*)     All other components within normal limits    Narrative:     GFR " Categories in Chronic Kidney Disease (CKD)              GFR Category          GFR (mL/min/1.73)    Interpretation  G1                    90 or greater        Normal or high (1)  G2                    60-89                Mild decrease (1)  G3a                   45-59                Mild to moderate decrease  G3b                   30-44                Moderate to severe decrease  G4                    15-29                Severe decrease  G5                    14 or less           Kidney failure    (1)In the absence of evidence of kidney disease, neither GFR category G1 or G2 fulfill the criteria for CKD.    eGFR calculation 2021 CKD-EPI creatinine equation, which does not include race as a factor   LIPASE - Abnormal; Notable for the following components:    Lipase 480 (*)     All other components within normal limits   URINALYSIS W/ CULTURE IF INDICATED - Abnormal; Notable for the following components:    Color, UA Dark Yellow (*)     Appearance, UA Cloudy (*)     Ketones, UA Trace (*)     Blood, UA Small (1+) (*)     Leuk Esterase, UA Trace (*)     All other components within normal limits    Narrative:     In absence of clinical symptoms, the presence of pyuria, bacteria, and/or nitrites on the urinalysis result does not correlate with infection.   URINALYSIS, MICROSCOPIC ONLY - Abnormal; Notable for the following components:    RBC, UA 6-10 (*)     Bacteria, UA 4+ (*)     Squamous Epithelial Cells, UA 7-12 (*)     All other components within normal limits   COVID-19/FLUA&B/RSV, NP SWAB IN TRANSPORT MEDIA 1 HR TAT - Normal    Narrative:     Fact sheet for providers: https://www.fda.gov/media/071940/download    Fact sheet for patients: https://www.fda.gov/media/929596/download    Test performed by PCR.   CBC WITH AUTO DIFFERENTIAL - Normal   SCAN SLIDE - Normal    Narrative:     Slide Reviewed     CBC AND DIFFERENTIAL    Narrative:     The following orders were created for panel order CBC & Differential.  Procedure                                Abnormality         Status                     ---------                               -----------         ------                     CBC Auto Differential[[id number]]        Normal              Final result               Scan Slide[[id number]]                   Normal              Final result                 Please view results for these tests on the individual orders.   EXTRA TUBES    Narrative:     The following orders were created for panel order Extra Tubes.  Procedure                               Abnormality         Status                     ---------                               -----------         ------                     Lavender Top[[id number]]                                     Final result               Gold Top - SST[[id number]]                                   Final result               Light Blue Top[[id number]]                                   Final result                 Please view results for these tests on the individual orders.   LAVENDER TOP   GOLD TOP - SST   LIGHT BLUE TOP     Medications   sodium chloride 0.9 % flush 10 mL (has no administration in time range)   lactated ringers infusion (has no administration in time range)   HYDROmorphone (DILAUDID) injection 0.5 mg (0.5 mg Intravenous Given 6/16/25 1607)   ondansetron (ZOFRAN) injection 4 mg (4 mg Intravenous Given 6/16/25 1606)   iopamidol (ISOVUE-370) 76 % injection 100 mL (100 mL Intravenous Given 6/16/25 1703)   sodium chloride 0.9 % bolus 1,000 mL (1,000 mL Intravenous New Bag 6/16/25 1810)   cefTRIAXone (ROCEPHIN) 2,000 mg in sodium chloride 0.9 % 100 mL MBP (0 mg Intravenous Stopped 6/16/25 2013)   metroNIDAZOLE (FLAGYL) IVPB 500 mg (500 mg Intravenous New Bag 6/16/25 2013)     CT Abdomen Pelvis With Contrast  Result Date: 6/16/2025  Impression: 1.Partially contracted gallbladder with mild gallbladder wall thickening and pericholecystic edema versus trace fluid. Correlate for symptoms of  "cholecystitis. Consider further evaluation with right upper quadrant ultrasound. 2.Mild endometrial thickening for the patient's age measuring 8 mm. Correlate with menstrual history. Consider further evaluation with pelvic ultrasound. Electronically Signed: Marcus Newell MD  6/16/2025 5:30 PM EDT  Workstation ID: [id number]                                                     Medical Decision Making  /66   Pulse 56   Temp 97.7 °F (36.5 °C) (Oral)   Resp 20   Ht 162.6 cm (64\")   Wt 88.5 kg (195 lb 1.7 oz)   SpO2 98%   BMI 33.49 kg/m²      Chart review: Patient has no recent ED/UC visits were reviewed.  Patient states abdominal pain has been intermittently present for 3 to 4 months but she has not sought medical care.    Patient is 54-year-old female who presents the emergency department with abdominal complaints.  See full HPI with primary assessment and exam above.    Patient is placed in ED bed in gown for assessment.  IV access is maintained for labs and medication administration if indicated.  Primary differentials for patient include but are not limited to pancreatitis, cholecystitis, ischemic bowel, viral gastritis.    Comorbidities:  has a past medical history of GERD (gastroesophageal reflux disease) and Obesity.    Discussion with provider: Dr. Bergman    Radiology interpretation:  Imaging reviewed by ED Attending physician and myself and interpreted by radiologist.  CT Abdomen Pelvis With Contrast  Result Date: 6/16/2025  Impression: 1.Partially contracted gallbladder with mild gallbladder wall thickening and pericholecystic edema versus trace fluid. Correlate for symptoms of cholecystitis. Consider further evaluation with right upper quadrant ultrasound. 2.Mild endometrial thickening for the patient's age measuring 8 mm. Correlate with menstrual history. Consider further evaluation with pelvic ultrasound. Electronically Signed: Marcus Newell MD  6/16/2025 5:30 PM EDT  Workstation ID: " [id number]    Lab interpretation:  Labs viewed by me significant for elevated lipase and urine with hematuria and what appears to be a contaminated sample.  No significant electrolyte derangement or kidney injury on CMP and no leukocytosis or significant anemia on CBC.    Medications given in ED and ordered for admission:  sodium chloride 0.9 % flush 10 mL (has no administration in time range)  lactated ringers infusion (has no administration in time range)  HYDROmorphone (DILAUDID) injection 0.5 mg (0.5 mg Intravenous Given 6/16/25 1607)  ondansetron (ZOFRAN) injection 4 mg (4 mg Intravenous Given 6/16/25 1606)  iopamidol (ISOVUE-370) 76 % injection 100 mL (100 mL Intravenous Given 6/16/25 1703)  sodium chloride 0.9 % bolus 1,000 mL (1,000 mL Intravenous New Bag 6/16/25 1810)  cefTRIAXone (ROCEPHIN) 2,000 mg in sodium chloride 0.9 % 100 mL MBP (0 mg Intravenous Stopped 6/16/25 2013)  metroNIDAZOLE (FLAGYL) IVPB 500 mg (500 mg Intravenous New Bag 6/16/25 2013)    Patient and family member informed of results and plan of care discussed during repeat physical exam.  Physical exam is unchanged from previous; patient remains alert and oriented, nontoxic in appearance GCS 15.  Patient is informed that the plan of care will be for her to be admitted to the hospital for further observation and evaluation, including a consult from GI tomorrow morning.  Patient verbalizes understanding of and is amenable to this plan of care.    Appropriate PPE worn during exam.    I discussed findings with patient who voiced understanding of discharge instructions, signs and symptoms requiring return to ED; discharged improved and in stable condition with follow up for re-evaluation.  This document is intended for medical expert use only. Reading of this document by patients and/or patient's family without participating medical staff guidance may result in misinterpretation and unintended morbidity.  Any interpretation of such data is the  responsibility of the patient and/or family member responsible for the patient in concert with their primary or specialist providers, not to be left for sources of online searches such as [facility], [employer] or similar queries. Relying on these approaches to knowledge may result in misinterpretation, misguided goals of care and even death should patients or family members try recommendations outside of the realm of professional medical care in a supervised inpatient environment.       Problems Addressed:  Abdominal pain, unspecified abdominal location: complicated acute illness or injury  Acute pancreatitis, unspecified complication status, unspecified pancreatitis type: complicated acute illness or injury  Cholecystitis: complicated acute illness or injury  Nausea and vomiting, unspecified vomiting type: complicated acute illness or injury    Amount and/or Complexity of Data Reviewed  Labs: ordered.  Radiology: ordered.    Risk  Prescription drug management.  Decision regarding hospitalization.        Final diagnoses:   Acute pancreatitis, unspecified complication status, unspecified pancreatitis type   Cholecystitis   Abdominal pain, unspecified abdominal location   Nausea and vomiting, unspecified vomiting type       ED Disposition  ED Disposition       ED Disposition   Decision to Admit    Condition   --    Comment   Level of Care: Med/Surg [1]   Diagnosis: Pancreatitis [[id number]]   Admitting Physician: DICK MOORE [[id number]]   Attending Physician: DICK MOORE [[id number]]                 No follow-up provider specified.       Medication List      No changes were made to your prescriptions during this visit.            Herve Hughes, [place]  06/16/25 [id number]

## 2025-06-17 ENCOUNTER — INPATIENT HOSPITAL (AMBULATORY)
Dept: URBAN - METROPOLITAN AREA HOSPITAL 84 | Facility: HOSPITAL | Age: 55
End: 2025-06-17
Payer: COMMERCIAL

## 2025-06-17 ENCOUNTER — ANESTHESIA (OUTPATIENT)
Dept: GASTROENTEROLOGY | Facility: HOSPITAL | Age: 55
End: 2025-06-17
Payer: COMMERCIAL

## 2025-06-17 ENCOUNTER — ANESTHESIA EVENT (OUTPATIENT)
Dept: GASTROENTEROLOGY | Facility: HOSPITAL | Age: 55
End: 2025-06-17
Payer: COMMERCIAL

## 2025-06-17 DIAGNOSIS — R74.01 ELEVATION OF LEVELS OF LIVER TRANSAMINASE LEVELS: ICD-10-CM

## 2025-06-17 DIAGNOSIS — Z79.85 LONG-TERM (CURRENT) USE OF INJECTABLE NON-INSULIN ANTIDIABET: ICD-10-CM

## 2025-06-17 DIAGNOSIS — K44.9 DIAPHRAGMATIC HERNIA WITHOUT OBSTRUCTION OR GANGRENE: ICD-10-CM

## 2025-06-17 DIAGNOSIS — K82.9 DISEASE OF GALLBLADDER, UNSPECIFIED: ICD-10-CM

## 2025-06-17 DIAGNOSIS — R93.2 ABNORMAL FINDINGS ON DIAGNOSTIC IMAGING OF LIVER AND BILIARY: ICD-10-CM

## 2025-06-17 DIAGNOSIS — E66.01 MORBID (SEVERE) OBESITY DUE TO EXCESS CALORIES: ICD-10-CM

## 2025-06-17 DIAGNOSIS — R10.13 EPIGASTRIC PAIN: ICD-10-CM

## 2025-06-17 DIAGNOSIS — F17.200 NICOTINE DEPENDENCE, UNSPECIFIED, UNCOMPLICATED: ICD-10-CM

## 2025-06-17 DIAGNOSIS — K31.89 OTHER DISEASES OF STOMACH AND DUODENUM: ICD-10-CM

## 2025-06-17 PROBLEM — E66.9 OBESITY (BMI 30-39.9): Status: ACTIVE | Noted: 2025-06-17

## 2025-06-17 PROBLEM — K81.0 ACUTE CHOLECYSTITIS: Status: ACTIVE | Noted: 2025-06-17

## 2025-06-17 PROBLEM — K21.9 GERD WITHOUT ESOPHAGITIS: Status: ACTIVE | Noted: 2025-06-17

## 2025-06-17 PROBLEM — R10.9 ABDOMINAL PAIN: Status: ACTIVE | Noted: 2025-06-17

## 2025-06-17 PROBLEM — I10 ESSENTIAL HYPERTENSION: Status: ACTIVE | Noted: 2025-06-17

## 2025-06-17 PROBLEM — F17.210 CIGARETTE SMOKER: Status: ACTIVE | Noted: 2025-06-17

## 2025-06-17 PROBLEM — K85.10 GALLSTONE PANCREATITIS: Status: ACTIVE | Noted: 2025-06-16

## 2025-06-17 LAB
ALPHA-FETOPROTEIN: <2 NG/ML (ref 0–8.3)
ANION GAP SERPL CALCULATED.3IONS-SCNC: 6.4 MMOL/L (ref 5–15)
BASOPHILS # BLD AUTO: 0.04 10*3/MM3 (ref 0–0.2)
BASOPHILS NFR BLD AUTO: 0.5 % (ref 0–1.5)
BUN SERPL-MCNC: 10 MG/DL (ref 6–20)
BUN/CREAT SERPL: 17.9 (ref 7–25)
CALCIUM SPEC-SCNC: 8.7 MG/DL (ref 8.6–10.5)
CHLORIDE SERPL-SCNC: 111 MMOL/L (ref 98–107)
CHOLEST SERPL-MCNC: 172 MG/DL (ref 0–200)
CO2 SERPL-SCNC: 21.6 MMOL/L (ref 22–29)
CREAT SERPL-MCNC: 0.56 MG/DL (ref 0.57–1)
DEPRECATED RDW RBC AUTO: 42 FL (ref 37–54)
EGFRCR SERPLBLD CKD-EPI 2021: 110 ML/MIN/1.73
EOSINOPHIL # BLD AUTO: 0.36 10*3/MM3 (ref 0–0.4)
EOSINOPHIL NFR BLD AUTO: 4.6 % (ref 0.3–6.2)
ERYTHROCYTE [DISTWIDTH] IN BLOOD BY AUTOMATED COUNT: 12.5 % (ref 12.3–15.4)
FERRITIN SERPL-MCNC: 68 NG/ML (ref 13–150)
GGT SERPL-CCNC: 179 U/L (ref 5–36)
GLUCOSE SERPL-MCNC: 87 MG/DL (ref 65–99)
HAV IGM SERPL QL IA: NORMAL
HBA1C MFR BLD: 5.54 % (ref 4.8–5.6)
HBV CORE IGM SERPL QL IA: NORMAL
HBV SURFACE AG SERPL QL IA: NORMAL
HCT VFR BLD AUTO: 35.9 % (ref 34–46.6)
HCV AB SER QL: NORMAL
HDLC SERPL-MCNC: 41 MG/DL (ref 40–60)
HGB BLD-MCNC: 12 G/DL (ref 12–15.9)
IMM GRANULOCYTES # BLD AUTO: 0.01 10*3/MM3 (ref 0–0.05)
IMM GRANULOCYTES NFR BLD AUTO: 0.1 % (ref 0–0.5)
IRON 24H UR-MRATE: 107 MCG/DL (ref 37–145)
IRON SATN MFR SERPL: 33 % (ref 20–50)
LDLC SERPL CALC-MCNC: 115 MG/DL (ref 0–100)
LDLC/HDLC SERPL: 2.79 {RATIO}
LIPASE SERPL-CCNC: 165 U/L (ref 13–60)
LYMPHOCYTES # BLD AUTO: 2.84 10*3/MM3 (ref 0.7–3.1)
LYMPHOCYTES NFR BLD AUTO: 35.9 % (ref 19.6–45.3)
MCH RBC QN AUTO: 30.7 PG (ref 26.6–33)
MCHC RBC AUTO-ENTMCNC: 33.4 G/DL (ref 31.5–35.7)
MCV RBC AUTO: 91.8 FL (ref 79–97)
MONOCYTES # BLD AUTO: 0.59 10*3/MM3 (ref 0.1–0.9)
MONOCYTES NFR BLD AUTO: 7.5 % (ref 5–12)
NEUTROPHILS NFR BLD AUTO: 4.07 10*3/MM3 (ref 1.7–7)
NEUTROPHILS NFR BLD AUTO: 51.4 % (ref 42.7–76)
NRBC BLD AUTO-RTO: 0 /100 WBC (ref 0–0.2)
PLATELET # BLD AUTO: 253 10*3/MM3 (ref 140–450)
PMV BLD AUTO: 9.6 FL (ref 6–12)
POTASSIUM SERPL-SCNC: 3.4 MMOL/L (ref 3.5–5.2)
QT INTERVAL: 468 MS
QTC INTERVAL: 454 MS
RBC # BLD AUTO: 3.91 10*6/MM3 (ref 3.77–5.28)
SODIUM SERPL-SCNC: 139 MMOL/L (ref 136–145)
TIBC SERPL-MCNC: 328 MCG/DL (ref 298–536)
TRANSFERRIN SERPL-MCNC: 220 MG/DL (ref 200–360)
TRIGL SERPL-MCNC: 83 MG/DL (ref 0–150)
VLDLC SERPL-MCNC: 16 MG/DL (ref 5–40)
WBC NRBC COR # BLD AUTO: 7.91 10*3/MM3 (ref 3.4–10.8)

## 2025-06-17 PROCEDURE — 83690 ASSAY OF LIPASE: CPT

## 2025-06-17 PROCEDURE — 86850 RBC ANTIBODY SCREEN: CPT

## 2025-06-17 PROCEDURE — 86015 ACTIN ANTIBODY EACH: CPT | Performed by: INTERNAL MEDICINE

## 2025-06-17 PROCEDURE — 25010000002 PROPOFOL 10 MG/ML EMULSION: Performed by: NURSE ANESTHETIST, CERTIFIED REGISTERED

## 2025-06-17 PROCEDURE — 85730 THROMBOPLASTIN TIME PARTIAL: CPT

## 2025-06-17 PROCEDURE — 25010000002 METRONIDAZOLE 500 MG/100ML SOLUTION

## 2025-06-17 PROCEDURE — 88305 TISSUE EXAM BY PATHOLOGIST: CPT | Performed by: INTERNAL MEDICINE

## 2025-06-17 PROCEDURE — 81332 SERPINA1 GENE: CPT | Performed by: INTERNAL MEDICINE

## 2025-06-17 PROCEDURE — 25810000003 LACTATED RINGERS PER 1000 ML

## 2025-06-17 PROCEDURE — 25010000002 CEFTRIAXONE PER 250 MG

## 2025-06-17 PROCEDURE — 0DB68ZX EXCISION OF STOMACH, VIA NATURAL OR ARTIFICIAL OPENING ENDOSCOPIC, DIAGNOSTIC: ICD-10-PCS | Performed by: INTERNAL MEDICINE

## 2025-06-17 PROCEDURE — 25010000002 HYDROMORPHONE PER 4 MG

## 2025-06-17 PROCEDURE — G0378 HOSPITAL OBSERVATION PER HR: HCPCS

## 2025-06-17 PROCEDURE — 82977 ASSAY OF GGT: CPT

## 2025-06-17 PROCEDURE — 99284 EMERGENCY DEPT VISIT MOD MDM: CPT | Performed by: STUDENT IN AN ORGANIZED HEALTH CARE EDUCATION/TRAINING PROGRAM

## 2025-06-17 PROCEDURE — 82784 ASSAY IGA/IGD/IGG/IGM EACH: CPT | Performed by: INTERNAL MEDICINE

## 2025-06-17 PROCEDURE — 84466 ASSAY OF TRANSFERRIN: CPT

## 2025-06-17 PROCEDURE — 43239 EGD BIOPSY SINGLE/MULTIPLE: CPT

## 2025-06-17 PROCEDURE — 25810000003 LACTATED RINGERS PER 1000 ML: Performed by: STUDENT IN AN ORGANIZED HEALTH CARE EDUCATION/TRAINING PROGRAM

## 2025-06-17 PROCEDURE — 86708 HEPATITIS A ANTIBODY: CPT

## 2025-06-17 PROCEDURE — 99222 1ST HOSP IP/OBS MODERATE 55: CPT

## 2025-06-17 PROCEDURE — 86140 C-REACTIVE PROTEIN: CPT | Performed by: INTERNAL MEDICINE

## 2025-06-17 PROCEDURE — 82103 ALPHA-1-ANTITRYPSIN TOTAL: CPT | Performed by: INTERNAL MEDICINE

## 2025-06-17 PROCEDURE — 80061 LIPID PANEL: CPT

## 2025-06-17 PROCEDURE — 43239 EGD BIOPSY SINGLE/MULTIPLE: CPT | Performed by: INTERNAL MEDICINE

## 2025-06-17 PROCEDURE — 82390 ASSAY OF CERULOPLASMIN: CPT | Performed by: INTERNAL MEDICINE

## 2025-06-17 PROCEDURE — 82728 ASSAY OF FERRITIN: CPT

## 2025-06-17 PROCEDURE — 93010 ELECTROCARDIOGRAM REPORT: CPT | Performed by: INTERNAL MEDICINE

## 2025-06-17 PROCEDURE — 83540 ASSAY OF IRON: CPT

## 2025-06-17 PROCEDURE — 86900 BLOOD TYPING SEROLOGIC ABO: CPT

## 2025-06-17 PROCEDURE — 85025 COMPLETE CBC W/AUTO DIFF WBC: CPT

## 2025-06-17 PROCEDURE — 25810000003 SODIUM CHLORIDE 0.9 % SOLUTION: Performed by: NURSE ANESTHETIST, CERTIFIED REGISTERED

## 2025-06-17 PROCEDURE — 86381 MITOCHONDRIAL ANTIBODY EACH: CPT | Performed by: INTERNAL MEDICINE

## 2025-06-17 PROCEDURE — 80053 COMPREHEN METABOLIC PANEL: CPT

## 2025-06-17 PROCEDURE — 86901 BLOOD TYPING SEROLOGIC RH(D): CPT

## 2025-06-17 PROCEDURE — 85610 PROTHROMBIN TIME: CPT

## 2025-06-17 PROCEDURE — 93005 ELECTROCARDIOGRAM TRACING: CPT | Performed by: STUDENT IN AN ORGANIZED HEALTH CARE EDUCATION/TRAINING PROGRAM

## 2025-06-17 PROCEDURE — 86038 ANTINUCLEAR ANTIBODIES: CPT

## 2025-06-17 PROCEDURE — 80074 ACUTE HEPATITIS PANEL: CPT

## 2025-06-17 PROCEDURE — 25010000002 LIDOCAINE PF 1% 1 % SOLUTION: Performed by: NURSE ANESTHETIST, CERTIFIED REGISTERED

## 2025-06-17 RX ORDER — HYDROMORPHONE HYDROCHLORIDE 1 MG/ML
0.5 INJECTION, SOLUTION INTRAMUSCULAR; INTRAVENOUS; SUBCUTANEOUS EVERY 4 HOURS PRN
Status: DISCONTINUED | OUTPATIENT
Start: 2025-06-17 | End: 2025-06-18

## 2025-06-17 RX ORDER — SODIUM CHLORIDE 9 MG/ML
INJECTION, SOLUTION INTRAVENOUS CONTINUOUS PRN
Status: DISCONTINUED | OUTPATIENT
Start: 2025-06-17 | End: 2025-06-17 | Stop reason: SURG

## 2025-06-17 RX ORDER — POLYETHYLENE GLYCOL 3350 17 G/17G
17 POWDER, FOR SOLUTION ORAL DAILY
Status: DISCONTINUED | OUTPATIENT
Start: 2025-06-18 | End: 2025-06-19 | Stop reason: HOSPADM

## 2025-06-17 RX ORDER — METRONIDAZOLE 500 MG/100ML
500 INJECTION, SOLUTION INTRAVENOUS EVERY 8 HOURS
Status: DISCONTINUED | OUTPATIENT
Start: 2025-06-17 | End: 2025-06-18

## 2025-06-17 RX ORDER — CYCLOBENZAPRINE HCL 10 MG
10 TABLET ORAL 3 TIMES DAILY PRN
Status: DISCONTINUED | OUTPATIENT
Start: 2025-06-17 | End: 2025-06-19 | Stop reason: HOSPADM

## 2025-06-17 RX ORDER — BISACODYL 5 MG/1
5 TABLET, DELAYED RELEASE ORAL DAILY PRN
Status: DISCONTINUED | OUTPATIENT
Start: 2025-06-17 | End: 2025-06-19 | Stop reason: HOSPADM

## 2025-06-17 RX ORDER — SODIUM CHLORIDE 0.9 % (FLUSH) 0.9 %
10 SYRINGE (ML) INJECTION EVERY 12 HOURS SCHEDULED
Status: DISCONTINUED | OUTPATIENT
Start: 2025-06-17 | End: 2025-06-19 | Stop reason: HOSPADM

## 2025-06-17 RX ORDER — HYDRALAZINE HYDROCHLORIDE 20 MG/ML
10 INJECTION INTRAMUSCULAR; INTRAVENOUS EVERY 6 HOURS PRN
Status: DISCONTINUED | OUTPATIENT
Start: 2025-06-17 | End: 2025-06-19 | Stop reason: HOSPADM

## 2025-06-17 RX ORDER — SODIUM CHLORIDE 0.9 % (FLUSH) 0.9 %
10 SYRINGE (ML) INJECTION AS NEEDED
Status: DISCONTINUED | OUTPATIENT
Start: 2025-06-17 | End: 2025-06-19 | Stop reason: HOSPADM

## 2025-06-17 RX ORDER — PROPOFOL 10 MG/ML
VIAL (ML) INTRAVENOUS AS NEEDED
Status: DISCONTINUED | OUTPATIENT
Start: 2025-06-17 | End: 2025-06-17 | Stop reason: SURG

## 2025-06-17 RX ORDER — PANTOPRAZOLE SODIUM 40 MG/10ML
40 INJECTION, POWDER, LYOPHILIZED, FOR SOLUTION INTRAVENOUS
Status: DISCONTINUED | OUTPATIENT
Start: 2025-06-17 | End: 2025-06-19

## 2025-06-17 RX ORDER — BISACODYL 10 MG
10 SUPPOSITORY, RECTAL RECTAL DAILY PRN
Status: DISCONTINUED | OUTPATIENT
Start: 2025-06-17 | End: 2025-06-19 | Stop reason: HOSPADM

## 2025-06-17 RX ORDER — DULOXETIN HYDROCHLORIDE 20 MG/1
40 CAPSULE, DELAYED RELEASE ORAL DAILY
Status: DISCONTINUED | OUTPATIENT
Start: 2025-06-17 | End: 2025-06-19 | Stop reason: HOSPADM

## 2025-06-17 RX ORDER — SODIUM CHLORIDE 9 MG/ML
40 INJECTION, SOLUTION INTRAVENOUS AS NEEDED
Status: DISCONTINUED | OUTPATIENT
Start: 2025-06-17 | End: 2025-06-19 | Stop reason: HOSPADM

## 2025-06-17 RX ORDER — ONDANSETRON 2 MG/ML
4 INJECTION INTRAMUSCULAR; INTRAVENOUS EVERY 6 HOURS PRN
Status: DISCONTINUED | OUTPATIENT
Start: 2025-06-17 | End: 2025-06-19 | Stop reason: HOSPADM

## 2025-06-17 RX ORDER — LIDOCAINE HYDROCHLORIDE 10 MG/ML
INJECTION, SOLUTION EPIDURAL; INFILTRATION; INTRACAUDAL; PERINEURAL AS NEEDED
Status: DISCONTINUED | OUTPATIENT
Start: 2025-06-17 | End: 2025-06-17 | Stop reason: SURG

## 2025-06-17 RX ORDER — POLYETHYLENE GLYCOL 3350 17 G/17G
17 POWDER, FOR SOLUTION ORAL DAILY PRN
Status: DISCONTINUED | OUTPATIENT
Start: 2025-06-17 | End: 2025-06-19 | Stop reason: HOSPADM

## 2025-06-17 RX ORDER — NICOTINE 21 MG/24HR
1 PATCH, TRANSDERMAL 24 HOURS TRANSDERMAL EVERY 24 HOURS
Status: DISCONTINUED | OUTPATIENT
Start: 2025-06-18 | End: 2025-06-18

## 2025-06-17 RX ORDER — SODIUM CHLORIDE, SODIUM LACTATE, POTASSIUM CHLORIDE, CALCIUM CHLORIDE 600; 310; 30; 20 MG/100ML; MG/100ML; MG/100ML; MG/100ML
125 INJECTION, SOLUTION INTRAVENOUS CONTINUOUS
Status: DISCONTINUED | OUTPATIENT
Start: 2025-06-17 | End: 2025-06-18

## 2025-06-17 RX ORDER — NICOTINE 21 MG/24HR
1 PATCH, TRANSDERMAL 24 HOURS TRANSDERMAL NIGHTLY
Status: DISCONTINUED | OUTPATIENT
Start: 2025-06-17 | End: 2025-06-17

## 2025-06-17 RX ORDER — AMOXICILLIN 250 MG
2 CAPSULE ORAL 2 TIMES DAILY PRN
Status: DISCONTINUED | OUTPATIENT
Start: 2025-06-17 | End: 2025-06-19 | Stop reason: HOSPADM

## 2025-06-17 RX ADMIN — METRONIDAZOLE 500 MG: 500 INJECTION, SOLUTION INTRAVENOUS at 20:49

## 2025-06-17 RX ADMIN — PANTOPRAZOLE SODIUM 40 MG: 40 INJECTION, POWDER, FOR SOLUTION INTRAVENOUS at 08:31

## 2025-06-17 RX ADMIN — PROPOFOL 50 MG: 10 INJECTION, EMULSION INTRAVENOUS at 15:32

## 2025-06-17 RX ADMIN — CEFTRIAXONE SODIUM 1000 MG: 1 INJECTION, POWDER, FOR SOLUTION INTRAMUSCULAR; INTRAVENOUS at 20:48

## 2025-06-17 RX ADMIN — Medication 10 ML: at 08:31

## 2025-06-17 RX ADMIN — SODIUM CHLORIDE, POTASSIUM CHLORIDE, SODIUM LACTATE AND CALCIUM CHLORIDE 125 ML/HR: 600; 310; 30; 20 INJECTION, SOLUTION INTRAVENOUS at 10:35

## 2025-06-17 RX ADMIN — LIDOCAINE HYDROCHLORIDE 50 MG: 10 INJECTION, SOLUTION EPIDURAL; INFILTRATION; INTRACAUDAL; PERINEURAL at 15:29

## 2025-06-17 RX ADMIN — Medication 10 ML: at 22:45

## 2025-06-17 RX ADMIN — PROPOFOL 100 MG: 10 INJECTION, EMULSION INTRAVENOUS at 15:29

## 2025-06-17 RX ADMIN — HYDROMORPHONE HYDROCHLORIDE 0.5 MG: 1 INJECTION, SOLUTION INTRAMUSCULAR; INTRAVENOUS; SUBCUTANEOUS at 03:40

## 2025-06-17 RX ADMIN — METRONIDAZOLE 500 MG: 500 INJECTION, SOLUTION INTRAVENOUS at 11:31

## 2025-06-17 RX ADMIN — HYDROMORPHONE HYDROCHLORIDE 0.5 MG: 1 INJECTION, SOLUTION INTRAMUSCULAR; INTRAVENOUS; SUBCUTANEOUS at 08:32

## 2025-06-17 RX ADMIN — SODIUM CHLORIDE: 9 INJECTION, SOLUTION INTRAVENOUS at 15:26

## 2025-06-17 RX ADMIN — NICOTINE 1 PATCH: 14 PATCH TRANSDERMAL at 01:00

## 2025-06-17 RX ADMIN — DULOXETINE 40 MG: 20 CAPSULE, DELAYED RELEASE ORAL at 08:31

## 2025-06-17 RX ADMIN — METRONIDAZOLE 500 MG: 500 INJECTION, SOLUTION INTRAVENOUS at 03:40

## 2025-06-17 RX ADMIN — SODIUM CHLORIDE, POTASSIUM CHLORIDE, SODIUM LACTATE AND CALCIUM CHLORIDE 75 ML/HR: 600; 310; 30; 20 INJECTION, SOLUTION INTRAVENOUS at 03:40

## 2025-06-17 RX ADMIN — HYDROMORPHONE HYDROCHLORIDE 0.5 MG: 1 INJECTION, SOLUTION INTRAMUSCULAR; INTRAVENOUS; SUBCUTANEOUS at 16:39

## 2025-06-17 NOTE — H&P
Patient Care Team:  Karin Briseno MD as PCP - General (Family Medicine)  Provider, No Known as PCP - Family Medicine    Chief complaint RUQ pain    Subjective     Patient is a 54 y.o. female with pmh of HTN who presents with abdominal pain since Saturday which is described as sharp in epigastric and radiating to her right shoulder. Pain has occurred intermittently over the past couple of years. No fevers or diarrhea. Reports occassional alcohol use and smoked 1/2 pack per day of cigarettes.  Last injection of Tirzepatide on 6/11.  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 Currently reports that she does not want further surgical intervention at this time and the pain waxes and wanes and she typically treats with NSAIDS.    In Er Lipase elevated at 480.  No leukocytosis or anemia.  CT  1.Partially contracted gallbladder with mild gallbladder wall thickening and pericholecystic edema versus trace fluid. Correlate for symptoms of cholecystitis. Consider further evaluation with right upper quadrant ultrasound.  2.Mild endometrial thickening for the patient's age measuring 8 mm. Correlate with menstrual history. Consider further evaluation with pelvic ultrasound.  RUQ ulturasound + Suggs's, Borderline gallbladder wall thickening   She was started on Rocephin and flagyl and received an IV fluid bolus along with pain control.  She will be admitted for surgical consult, IV fluids and pain control.    Review of Systems   Constitutional:  Negative for fever.   Cardiovascular:  Negative for chest pain.   Gastrointestinal:  Positive for abdominal pain, nausea and vomiting.   Genitourinary:  Negative for difficulty urinating.   Musculoskeletal:  Positive for joint swelling.   Neurological:  Negative for weakness.          History  Past Medical History:   Diagnosis Date    GERD (gastroesophageal reflux disease)     Obesity      Past Surgical History:    Procedure Laterality Date    PARTIAL KNEE ARTHROPLASTY      TUBAL ABDOMINAL LIGATION       Family History   Problem Relation Age of Onset    Heart disease Father      Social History     Tobacco Use    Smoking status: Former     Current packs/day: 1.00     Types: Cigarettes     Passive exposure: Current    Smokeless tobacco: Never   Vaping Use    Vaping status: Never Used   Substance Use Topics    Alcohol use: Yes     Comment: socially    Drug use: Never     (Not in a hospital admission)    Allergies:  Percocet [oxycodone-acetaminophen]    Objective     Vital Signs  Temp:  [97.7 °F (36.5 °C)] 97.7 °F (36.5 °C)  Heart Rate:  [53-69] 56  Resp:  [20] 20  BP: (103-130)/(65-87) 130/66     Physical Exam:      General Appearance:    Alert, cooperative, in no acute distress   Head:    Normocephalic, without obvious abnormality, atraumatic   Eyes:            Lids and lashes normal, conjunctivae and sclerae normal, no   icterus, no pallor, corneas clear, PERRLA   Ears:    Ears appear intact with no abnormalities noted   Throat:   No oral lesions, no thrush, oral mucosa dry   Neck:   No adenopathy, supple, trachea midline, no thyromegaly, no   carotid bruit, no JVD   Lungs:     Clear to auscultation,respirations regular, even and                  unlabored    Heart:    Regular rhythm and normal rate, normal S1 and S2, no            murmur, no gallop, no rub, no click   Chest Wall:    No abnormalities observed   Abdomen:     Normal bowel sounds, no masses, no organomegaly, soft        RUQ tender, non-distended, no guarding, positive schaffer's sign   Extremities:   Moves all extremities well, no edema, no cyanosis, no             redness; mild swelling to right knee   Pulses:   Pulses palpable and equal bilaterally   Skin:   No bleeding, bruising or rash   Lymph nodes:   No palpable adenopathy   Neurologic:   Cranial nerves 2 - 12 grossly intact, sensation intact, DTR       present and equal bilaterally       Results Review:      Imaging Results (Last 24 Hours)       Procedure Component Value Units Date/Time    US Abdomen Limited [[id number]] Collected: 06/16/25 2229     Updated: 06/16/25 2235    Narrative:      US ABDOMEN LIMITED    Date of Exam: 6/16/2025 9:28 PM EDT    Indication: cholecystitis.    Comparison: Same day contrast-enhanced CT of the abdomen and pelvis.    Technique: Grayscale and color Doppler ultrasound evaluation of the right upper quadrant was performed.      Findings:    Limited study due to overlying bowel gas.    The liver appears normal in size and echogenicity. No focal lesions identified. Normal flow is present within the hepatic vasculature.    Multiple small gallstones are visualized. There is borderline gallbladder wall thickening measuring 0.3 cm. Gallbladder is partially contracted. No pericholecystic fluid is visualized. Positive Suggs sign was reported by the sonographer. Common bile   duct is within normal limits in caliber and measures 0.4 cm    The pancreas is not visualized due to overlying bowel gas.    The right kidney appears normal in size with no focal lesions. No evidence of nephrolithiasis or hydronephrosis. The right kidney measures 11.1 x 5.6 x 5.7 cm.           Impression:      Impression:    Cholelithiasis. Borderline gallbladder wall thickening. Positive Suggs sign was reported by the sonographer. Cannot exclude acute cholecystitis. Correlate with patient symptoms. This can be further evaluated with HIDA scan as clinically warranted.        Electronically Signed: Marcus Newell MD    6/16/2025 10:33 PM EDT    Workstation ID: [id number]    CT Abdomen Pelvis With Contrast [[id number]] Collected: 06/16/25 1723     Updated: 06/16/25 1732    Narrative:      CT ABDOMEN PELVIS W CONTRAST    Date of Exam: 6/16/2025 4:45 PM EDT    Indication: epigastric and bilateral CVA pain.    Comparison: Contrast-enhanced CT of the abdomen pelvis performed on March 22, 2024    Technique: Axial CT images were  obtained of the abdomen and pelvis following the uneventful intravenous administration of iodinated contrast. Sagittal and coronal reconstructions were performed.  Automated exposure control and iterative reconstruction   methods were used.        Findings:    Lung Bases:  No focal consolidation or effusion. Faintly calcified nodule in the left lower lobe appears stable when compared to prior CT, likely representing granuloma.    Peritoneum:  No free intraperitoneal air or fluid.     Abdominal wall:  Small fat-containing umbilical hernia is visualized.    Liver:  Liver is normal in size and contour. No focal lesions.    Biliary/Gallbladder:  The gallbladder is partially contracted. There is evidence of mild gallbladder wall thickening and pericholecystic edema versus trace fluid. No radiopaque gallstones are visualized. The biliary tree is nondilated.    Pancreas:  Pancreas is within normal limits. There is no evidence of pancreatic mass or peripancreatic inflammatory changes.    Spleen:  Spleen is normal in size and contour.    Gastrointestinal/Mesentery:   No evidence of bowel obstruction or gross inflammatory changes. The appendix appears within normal limits. There is sigmoid diverticulosis without evidence of diverticulitis.    Adrenals:  Adrenal glands are unremarkable.    Kidneys:  The kidneys are in anatomic position. No evidence of nephrolithiasis. No evidence of hydronephrosis or significant perinephric fat stranding.    Bladder:   The urinary bladder is unremarkable.    Reproductive organs:    There is mild endometrial thickening for the patient's age measuring 8 mm. No adnexal masses are visualized.    Lymph Nodes:  No significant adenopathy is identified.     Vasculature:  Small scattered calcified plaques are visualized. The abdominal aorta is normal in caliber.    Osseous Structures:    No acute fracture or aggressive lesions.        Impression:      Impression:  1.Partially contracted gallbladder  with mild gallbladder wall thickening and pericholecystic edema versus trace fluid. Correlate for symptoms of cholecystitis. Consider further evaluation with right upper quadrant ultrasound.  2.Mild endometrial thickening for the patient's age measuring 8 mm. Correlate with menstrual history. Consider further evaluation with pelvic ultrasound.        Electronically Signed: Marcus Newell MD    6/16/2025 5:30 PM EDT    Workstation ID: [id number]             Lab Results (last 24 hours)       Procedure Component Value Units Date/Time    Lipase [[id number]]  (Abnormal) Collected: 06/17/25 0259    Specimen: Blood from Arm, Left Updated: 06/17/25 0328     Lipase 165 U/L     Basic Metabolic Panel [[id number]]  (Abnormal) Collected: 06/17/25 0259    Specimen: Blood from Arm, Left Updated: 06/17/25 0328     Glucose 87 mg/dL      BUN 10.0 mg/dL      Creatinine 0.56 mg/dL      Sodium 139 mmol/L      Potassium 3.4 mmol/L      Chloride 111 mmol/L      CO2 21.6 mmol/L      Calcium 8.7 mg/dL      BUN/Creatinine Ratio 17.9     Anion Gap 6.4 mmol/L      eGFR 110.0 mL/min/1.73     Narrative:      GFR Categories in Chronic Kidney Disease (CKD)              GFR Category          GFR (mL/min/1.73)    Interpretation  G1                    90 or greater        Normal or high (1)  G2                    60-89                Mild decrease (1)  G3a                   45-59                Mild to moderate decrease  G3b                   30-44                Moderate to severe decrease  G4                    15-29                Severe decrease  G5                    14 or less           Kidney failure    (1)In the absence of evidence of kidney disease, neither GFR category G1 or G2 fulfill the criteria for CKD.    eGFR calculation 2021 CKD-EPI creatinine equation, which does not include race as a factor    Lipid Panel [[id number]]  (Abnormal) Collected: 06/17/25 0259    Specimen: Blood from Arm, Left Updated: 06/17/25 0328     Total Cholesterol 172  mg/dL      Triglycerides 83 mg/dL      HDL Cholesterol 41 mg/dL      LDL Cholesterol  115 mg/dL      VLDL Cholesterol 16 mg/dL      LDL/HDL Ratio 2.79    Narrative:      Cholesterol Reference Ranges  (U.S. Department of Health and Human Services ATP III Classifications)    Desirable          <200 mg/dL  Borderline High    200-239 mg/dL  High Risk          >240 mg/dL      Triglyceride Reference Ranges  (U.S. Department of Health and Human Services ATP III Classifications)    Normal           <150 mg/dL  Borderline High  150-199 mg/dL  High             200-499 mg/dL  Very High        >500 mg/dL    HDL Reference Ranges  (U.S. Department of Health and Human Services ATP III Classifications)    Low     <40 mg/dl (major risk factor for CHD)  High    >60 mg/dl ('negative' risk factor for CHD)        LDL Reference Ranges  (U.S. Department of Health and Human Services ATP III Classifications)    Optimal          <100 mg/dL  Near Optimal     100-129 mg/dL  Borderline High  130-159 mg/dL  High             160-189 mg/dL  Very High        >189 mg/dL    LDL is calculated using the NIH LDL-C calculation.      CBC & Differential [[id number]]  (Normal) Collected: 06/17/25 0259    Specimen: Blood from Arm, Left Updated: 06/17/25 0304    Narrative:      The following orders were created for panel order CBC & Differential.  Procedure                               Abnormality         Status                     ---------                               -----------         ------                     CBC Auto Differential[[id number]]        Normal              Final result                 Please view results for these tests on the individual orders.    CBC Auto Differential [[id number]]  (Normal) Collected: 06/17/25 0259    Specimen: Blood from Arm, Left Updated: 06/17/25 0304     WBC 7.91 10*3/mm3      RBC 3.91 10*6/mm3      Hemoglobin 12.0 g/dL      Hematocrit 35.9 %      MCV 91.8 fL      MCH 30.7 pg      MCHC 33.4 g/dL      RDW 12.5 %       RDW-SD 42.0 fl      MPV 9.6 fL      Platelets 253 10*3/mm3      Neutrophil % 51.4 %      Lymphocyte % 35.9 %      Monocyte % 7.5 %      Eosinophil % 4.6 %      Basophil % 0.5 %      Immature Grans % 0.1 %      Neutrophils, Absolute 4.07 10*3/mm3      Lymphocytes, Absolute 2.84 10*3/mm3      Monocytes, Absolute 0.59 10*3/mm3      Eosinophils, Absolute 0.36 10*3/mm3      Basophils, Absolute 0.04 10*3/mm3      Immature Grans, Absolute 0.01 10*3/mm3      nRBC 0.0 /100 WBC     Hemoglobin A1c [[id number]]  (Normal) Collected: 06/16/25 1555    Specimen: Blood from Arm, Left Updated: 06/17/25 0029     Hemoglobin A1C 5.54 %     Narrative:      Hemoglobin A1C Ranges:    Increased Risk for Diabetes  5.7% to 6.4%  Diabetes                     >= 6.5%  Diabetic Goal                < 7.0%    COVID-19, FLU A/B, RSV PCR 1 HR TAT - Swab, Nasopharynx [[id number]]  (Normal) Collected: 06/16/25 2000    Specimen: Swab from Nasopharynx Updated: 06/16/25 2047     COVID19 Not Detected     Influenza A PCR Not Detected     Influenza B PCR Not Detected     RSV, PCR Not Detected    Narrative:      Fact sheet for providers: https://www.fda.gov/media/519215/download    Fact sheet for patients: https://www.fda.gov/media/090232/download    Test performed by PCR.    Lipase [[id number]]  (Abnormal) Collected: 06/16/25 1555    Specimen: Blood from Arm, Left Updated: 06/16/25 1637     Lipase 480 U/L     Extra Tubes [[id number]] Collected: 06/16/25 1555    Specimen: Blood from Arm, Left Updated: 06/16/25 1631    Narrative:      The following orders were created for panel order Extra Tubes.  Procedure                               Abnormality         Status                     ---------                               -----------         ------                     Lavender Top[[id number]]                                     Final result               Gold Top - SST[[id number]]                                   Final result               Light Blue  Top[[id number]]                                   Final result                 Please view results for these tests on the individual orders.    Lavender Top [[id number]] Collected: 06/16/25 1555    Specimen: Blood from Arm, Left Updated: 06/16/25 1631     Extra Tube hold for add-on     Comment: Auto resulted       Gold Top - SST [[id number]] Collected: 06/16/25 1555    Specimen: Blood from Arm, Left Updated: 06/16/25 1631     Extra Tube Hold for add-ons.     Comment: Auto resulted.       Light Blue Top [[id number]] Collected: 06/16/25 1555    Specimen: Blood from Arm, Left Updated: 06/16/25 1631     Extra Tube Hold for add-ons.     Comment: Auto resulted       Comprehensive Metabolic Panel [[id number]]  (Abnormal) Collected: 06/16/25 1555    Specimen: Blood from Arm, Left Updated: 06/16/25 1628     Glucose 94 mg/dL      BUN 11.7 mg/dL      Creatinine 0.57 mg/dL      Sodium 138 mmol/L      Potassium 3.8 mmol/L      Comment: Specimen hemolyzed.  Result may be falsely elevated.        Chloride 105 mmol/L      CO2 20.6 mmol/L      Calcium 9.1 mg/dL      Total Protein 6.7 g/dL      Albumin 4.4 g/dL      ALT (SGPT) 158 U/L      Comment: Specimen hemolyzed.  Result may  be falsely elevated.        AST (SGOT) 70 U/L      Comment: Specimen hemolyzed.  Result may be falsely elevated.        Alkaline Phosphatase 141 U/L      Total Bilirubin 0.6 mg/dL      Globulin 2.3 gm/dL      A/G Ratio 1.9 g/dL      BUN/Creatinine Ratio 20.5     Anion Gap 12.4 mmol/L      eGFR 109.5 mL/min/1.73     Narrative:      GFR Categories in Chronic Kidney Disease (CKD)              GFR Category          GFR (mL/min/1.73)    Interpretation  G1                    90 or greater        Normal or high (1)  G2                    60-89                Mild decrease (1)  G3a                   45-59                Mild to moderate decrease  G3b                   30-44                Moderate to severe decrease  G4                    15-29                Severe  decrease  G5                    14 or less           Kidney failure    (1)In the absence of evidence of kidney disease, neither GFR category G1 or G2 fulfill the criteria for CKD.    eGFR calculation 2021 CKD-EPI creatinine equation, which does not include race as a factor    CBC & Differential [[id number]]  (Normal) Collected: 06/16/25 1555    Specimen: Blood from Arm, Left Updated: 06/16/25 1624    Narrative:      The following orders were created for panel order CBC & Differential.  Procedure                               Abnormality         Status                     ---------                               -----------         ------                     CBC Auto Differential[[id number]]        Normal              Final result               Scan Slide[[id number]]                   Normal              Final result                 Please view results for these tests on the individual orders.    CBC Auto Differential [[id number]]  (Normal) Collected: 06/16/25 1555    Specimen: Blood from Arm, Left Updated: 06/16/25 1624     WBC 7.57 10*3/mm3      RBC 4.21 10*6/mm3      Hemoglobin 13.1 g/dL      Hematocrit 38.8 %      MCV 92.2 fL      MCH 31.1 pg      MCHC 33.8 g/dL      RDW 12.5 %      RDW-SD 42.4 fl      MPV 10.5 fL      Platelets 264 10*3/mm3      Neutrophil % 52.2 %      Lymphocyte % 35.8 %      Monocyte % 7.4 %      Eosinophil % 3.8 %      Basophil % 0.4 %      Immature Grans % 0.4 %      Neutrophils, Absolute 3.95 10*3/mm3      Lymphocytes, Absolute 2.71 10*3/mm3      Monocytes, Absolute 0.56 10*3/mm3      Eosinophils, Absolute 0.29 10*3/mm3      Basophils, Absolute 0.03 10*3/mm3      Immature Grans, Absolute 0.03 10*3/mm3      nRBC 0.0 /100 WBC     Scan Slide [[id number]]  (Normal) Collected: 06/16/25 1555    Specimen: Blood from Arm, Left Updated: 06/16/25 1624     RBC Morphology Normal     WBC Morphology Normal     Platelet Morphology Normal    Narrative:      Slide Reviewed      Urinalysis With Culture If  Indicated - Urine, Clean Catch [[id number]]  (Abnormal) Collected: 06/16/25 1548    Specimen: Urine, Clean Catch Updated: 06/16/25 1608     Color, UA Dark Yellow     Appearance, UA Cloudy     pH, UA 6.0     Specific Gravity, UA 1.024     Glucose, UA Negative     Ketones, UA Trace     Bilirubin, UA Negative     Blood, UA Small (1+)     Protein, UA Negative     Leuk Esterase, UA Trace     Nitrite, UA Negative     Urobilinogen, UA 1.0 E.U./dL    Narrative:      In absence of clinical symptoms, the presence of pyuria, bacteria, and/or nitrites on the urinalysis result does not correlate with infection.    Urinalysis, Microscopic Only - Urine, Clean Catch [[id number]]  (Abnormal) Collected: 06/16/25 1548    Specimen: Urine, Clean Catch Updated: 06/16/25 1608     RBC, UA 6-10 /HPF      WBC, UA 0-2 /HPF      Comment: Urine culture not indicated.        Bacteria, UA 4+ /HPF      Squamous Epithelial Cells, UA 7-12 /HPF      Hyaline Casts, UA None Seen /LPF      Methodology Automated Microscopy             I reviewed the patient's new clinical results.    Assessment & Plan       Acute cholecystitis    Abdominal pain    Essential hypertension    Cigarette smoker    GERD without esophagitis    Obesity (BMI 30-39.9)      Cholecystitis   NPO   -consult surgery   -Pain  and nausea control    HTN- prn hydralazine while NPO    Nicotine patch for smoking. Encouraged smoking cessation    Right knee pain- pt to follow up with ortho as outpatient.   Hx of partial knee replacement.    GI: Protonix  VTE: SCD    CODE STATUS:  Code status (Patient has no pulse and is not breathing):  CPR (Attempt to Resuscitate)  Medical Interventions (Patient has pulse or is breathing):  Full Support  Level of Support Discussed with:  Patient    Admission Status:  I believe this patient meets observation status    Expected length of stay:  2 midnights or less    I discussed the patient's findings and my recommendations with patient.     Brie Blanco,  ABE  06/17/25  05:11 EDT

## 2025-06-17 NOTE — CONSULTS
General Surgery Consult Note  Requesting Provider:  Dr. Manning (Hospitalist)    Reason for Consult:  cholecystitis     Chief Complaint:  abdominal pain     History of Present Illness:  Gerri Salcido is a 54 y.o. female who presents to [facility] c/o upper abdominal pain associate with nausea and vomiting.  Patient reports history of similar complaints and was previously seen in the ED in March for what sounds like biliary colic.  Patient elected to go home prior to completion of rib quadrant ultrasound.  Current symptoms have been worsening over the last couple days.  Patient describes epigastric pain with radiation to the back.  Denies fevers, chills, jaundice, or darkening urine.    In the ED, patient was afebrile and hemodynamically stable.  Abdominal exam was remarkable for right upper quadrant and epigastric tenderness with a positive Suggs sign.  Labs were remarkable for pancreatitis (lipase 480), mild transaminitis (AST 70, ), and elevated alkaline phosphatase (); WBC normal (7.5).  CT abdomen/pelvis demonstrate evidence of a contracted gallbladder with pericholecystic edema.  Right upper quadrant ultrasound demonstrate evidence of cholelithiasis with borderline gallbladder wall thickening (3 mm); CBD 4 mm.  General surgery was consulted for further recommendations.    Past Medical History:   Diagnosis Date    GERD (gastroesophageal reflux disease)     Obesity      Past Surgical History:   Procedure Laterality Date    PARTIAL KNEE ARTHROPLASTY      TUBAL ABDOMINAL LIGATION       Family History   Problem Relation Age of Onset    Heart disease Father      Social History     Socioeconomic History    Marital status:    Tobacco Use    Smoking status: Former     Current packs/day: 1.00     Types: Cigarettes     Passive exposure: Current    Smokeless tobacco: Never   Vaping Use    Vaping status: Never Used   Substance and Sexual Activity    Alcohol use: Yes     Comment: socially    Drug use:  Never    Sexual activity: Defer       Medications:  (Not in a hospital admission)     Allergies   Allergen Reactions    Percocet [Oxycodone-Acetaminophen] Rash       Review of Systems:  Negative in a 12 point ROS except for as above described.    Physical Examination:  Temp:  [97.7 °F (36.5 °C)-98 °F (36.7 °C)] 97.8 °F (36.6 °C)  Heart Rate:  [53-69] 56  Resp:  [15-20] 18  BP: (103-130)/(57-87) 109/66  I/O last 3 completed shifts:  In: 100 [IV Piggyback:100]  Out: -     General: No acute distress, conversant  Eyes: Anicteric sclerae, EOMs intact  Lungs: normal respiratory effort, bilateral chest rise with inspiration  Abdomen: soft, epigastric > RUQ TTP, ND, dull to percussion; no guarding or rigidity; negative schaffer sign   Skin: Warm; no rash.  No jaundice.    Labs:  WBC   Date Value Ref Range Status   06/17/2025 7.91 3.40 - 10.80 10*3/mm3 Final     Hemoglobin   Date Value Ref Range Status   06/17/2025 12.0 12.0 - 15.9 g/dL Final     Hematocrit   Date Value Ref Range Status   06/17/2025 35.9 34.0 - 46.6 % Final     MCV   Date Value Ref Range Status   06/17/2025 91.8 79.0 - 97.0 fL Final     Platelets   Date Value Ref Range Status   06/17/2025 253 140 - 450 10*3/mm3 Final     Neutrophil %   Date Value Ref Range Status   06/17/2025 51.4 42.7 - 76.0 % Final     Lab Results   Component Value Date    GLUCOSE 87 06/17/2025    BUN 10.0 06/17/2025    CREATININE 0.56 (L) 06/17/2025     06/17/2025    K 3.4 (L) 06/17/2025     (H) 06/17/2025    CALCIUM 8.7 06/17/2025    PROTEINTOT 6.7 06/16/2025    ALBUMIN 4.4 06/16/2025     (H) 06/16/2025    AST 70 (H) 06/16/2025    ALKPHOS 141 (H) 06/16/2025    BILITOT 0.6 06/16/2025    GLOB 2.3 06/16/2025    AGRATIO 1.9 06/16/2025    BCR 17.9 06/17/2025    ANIONGAP 6.4 06/17/2025    EGFR 110.0 06/17/2025     Lab Results   Component Value Date    INR 1.02 07/18/2020    INR 1.0 07/10/2015    PROTIME 10.6 07/18/2020    PROTIME 10.8 07/10/2015         No results found for:  "\"BLOODCX\"    Images:  US Abdomen Limited  Narrative: US ABDOMEN LIMITED    Date of Exam: 6/16/2025 9:28 PM EDT    Indication: cholecystitis.    Comparison: Same day contrast-enhanced CT of the abdomen and pelvis.    Technique: Grayscale and color Doppler ultrasound evaluation of the right upper quadrant was performed.    Findings:    Limited study due to overlying bowel gas.    The liver appears normal in size and echogenicity. No focal lesions identified. Normal flow is present within the hepatic vasculature.    Multiple small gallstones are visualized. There is borderline gallbladder wall thickening measuring 0.3 cm. Gallbladder is partially contracted. No pericholecystic fluid is visualized. Positive Suggs sign was reported by the sonographer. Common bile   duct is within normal limits in caliber and measures 0.4 cm    The pancreas is not visualized due to overlying bowel gas.    The right kidney appears normal in size with no focal lesions. No evidence of nephrolithiasis or hydronephrosis. The right kidney measures 11.1 x 5.6 x 5.7 cm.       Impression: Impression:    Cholelithiasis. Borderline gallbladder wall thickening. Positive Suggs sign was reported by the sonographer. Cannot exclude acute cholecystitis. Correlate with patient symptoms. This can be further evaluated with HIDA scan as clinically warranted.    Electronically Signed: Marcus Newell MD    6/16/2025 10:33 PM EDT    Workstation ID: [id number]  CT Abdomen Pelvis With Contrast  Narrative: CT ABDOMEN PELVIS W CONTRAST    Date of Exam: 6/16/2025 4:45 PM EDT    Indication: epigastric and bilateral CVA pain.    Comparison: Contrast-enhanced CT of the abdomen pelvis performed on March 22, 2024    Technique: Axial CT images were obtained of the abdomen and pelvis following the uneventful intravenous administration of iodinated contrast. Sagittal and coronal reconstructions were performed.  Automated exposure control and iterative reconstruction "   methods were used.    Findings:    Lung Bases:  No focal consolidation or effusion. Faintly calcified nodule in the left lower lobe appears stable when compared to prior CT, likely representing granuloma.    Peritoneum:  No free intraperitoneal air or fluid.     Abdominal wall:  Small fat-containing umbilical hernia is visualized.    Liver:  Liver is normal in size and contour. No focal lesions.    Biliary/Gallbladder:  The gallbladder is partially contracted. There is evidence of mild gallbladder wall thickening and pericholecystic edema versus trace fluid. No radiopaque gallstones are visualized. The biliary tree is nondilated.    Pancreas:  Pancreas is within normal limits. There is no evidence of pancreatic mass or peripancreatic inflammatory changes.    Spleen:  Spleen is normal in size and contour.    Gastrointestinal/Mesentery:   No evidence of bowel obstruction or gross inflammatory changes. The appendix appears within normal limits. There is sigmoid diverticulosis without evidence of diverticulitis.    Adrenals:  Adrenal glands are unremarkable.    Kidneys:  The kidneys are in anatomic position. No evidence of nephrolithiasis. No evidence of hydronephrosis or significant perinephric fat stranding.    Bladder:   The urinary bladder is unremarkable.    Reproductive organs:    There is mild endometrial thickening for the patient's age measuring 8 mm. No adnexal masses are visualized.    Lymph Nodes:  No significant adenopathy is identified.     Vasculature:  Small scattered calcified plaques are visualized. The abdominal aorta is normal in caliber.    Osseous Structures:    No acute fracture or aggressive lesions.  Impression: Impression:  1.Partially contracted gallbladder with mild gallbladder wall thickening and pericholecystic edema versus trace fluid. Correlate for symptoms of cholecystitis. Consider further evaluation with right upper quadrant ultrasound.  2.Mild endometrial thickening for the  patient's age measuring 8 mm. Correlate with menstrual history. Consider further evaluation with pelvic ultrasound.    Electronically Signed: Marcus Newell MD    6/16/2025 5:30 PM EDT    Workstation ID: [id number]       I personally reviewed the available laboratory values and radiographic studies.      Assessment:  54 y.o. female with gallstone pancreatitis.  Disagree with diagnosis of cholecystitis, as patient has a normal gallbladder wall and no evidence of leukocytosis.  Pericholecystic fluid is likely due to pancreatitis.  As such, patient would benefit from cholecystectomy prior to discharge.  Too tender for surgery today.    Plan:  Trend LFTs  Ok for diet today from surgical standpoint, but would wait until GI has evaluated the patient; NPO at midnight   No need for abx from surgical perspective   Plan for robotic cholecystectomy with IOC tomorrow afternoon unless GI is planning for ERCP. In such case, cholecystectomy can be deferred until prior to discharge.  Rest of care per primary team     Please do not hesitate to call me with any questions or concerns.  Thank you for involving me in the care of your patient, and I look forward to taking care of the patient with you.     Abel Kaplna MD   General Surgery  06/17/25   10:02 EDT     Much of this encounter note is an electronic transcription/translation of spoken language to printed text.  The electronic translation of spoken language may permit erroneous, or at times, nonsensical words or phrases to be inadvertently transcribed.  Although I have reviewed the note for such errors, some may still exist.

## 2025-06-17 NOTE — OP NOTE
ESOPHAGOGASTRODUODENOSCOPY Procedure Report    Patient Name:  Gerri Salcido  YOB: 1970    Date of Surgery:  6/17/2025     Pre-Op Diagnosis:  Epigastric abdominal pain [R10.13]       Post-Op Diagnosis Codes:     * Epigastric abdominal pain [R10.13]    Post Op Diagnosis:  Gastritis  Hiatal hernia    Procedure/CPT® Codes:  NM ESOPHAGOGASTRODUODENOSCOPY TRANSORAL DIAGNOSTIC [95601]    Procedure(s):  ESOPHAGOGASTRODUODENOSCOPY with gastric biopsy    Staff:  Surgeon(s):  INDU Monroy MD      Anesthesia: Monitored Anesthesia Care    Code status:  Discussed code status with patient and they will remain full code    Description of Procedure:  A description of the procedure as well as risks, benefits and alternative methods were explained to the patient who voiced understanding and signed the corresponding consent form. A physical exam was performed and vital signs were monitored throughout the procedure.    An upper GI endoscope was placed into the mouth and proceeded through the esophagus, stomach and second portion of the duodenum without difficulty. The scope was then retroflexed and the fundus was visualized. The procedure was not difficult and there were no immediate complications.  There was no blood loss.    EGD Findings:  1.  Mild erythema in the stomach consistent with gastritis.  No ulcers were seen.  Biopsies were obtained with cold forceps from stomach antrum and body to rule out H. pylori  2.  Medium size sliding hiatal hernia, 4 to 5 cm in length, Hill grade 4  3.  Normal mucosa of the whole esophagus  4.  Normal mucosa of the duodenal bulb and second portion of the duodenum    Recommendations:  -Continue PPI daily  - Follow-up biopsies for H. pylori, treat if positive  - Suspect pain mostly due to acute pancreatitis, likely due to medication  - Continue IV fluids, trend lipase and CRP daily  -Okay for clear liquid diet now and advancing as tolerated    PAU Monroy MD     Date:  6/17/2025    Time: 15:39 EDT

## 2025-06-17 NOTE — ANESTHESIA POSTPROCEDURE EVALUATION
Patient: Gerri Salcido    Procedure Summary       Date: 06/17/25 Room / Location: [facility] ENDOSCOPY 1 / [facility] ENDOSCOPY    Anesthesia Start: 1526 Anesthesia Stop: 1537    Procedure: ESOPHAGOGASTRODUODENOSCOPY with gastric biopsy Diagnosis:       Epigastric abdominal pain      (Epigastric abdominal pain [R10.13])    Surgeons: INDU Monroy MD Provider: Steve Little MD    Anesthesia Type: MAC ASA Status: 2            Anesthesia Type: MAC    Vitals  Vitals Value Taken Time   /79 06/17/25 16:04   Temp     Pulse 57 06/17/25 16:09   Resp 15 06/17/25 15:39   SpO2 97 % 06/17/25 16:09   Vitals shown include unfiled device data.        Post Anesthesia Care and Evaluation    Patient location during evaluation: PACU  Patient participation: complete - patient participated  Level of consciousness: awake  Pain scale: See nurse's notes for pain score.  Pain management: adequate    Airway patency: patent  Anesthetic complications: No anesthetic complications  PONV Status: none  Cardiovascular status: acceptable  Respiratory status: acceptable and spontaneous ventilation  Hydration status: acceptable    Comments: Patient seen and examined postoperatively; vital signs stable; SpO2 greater than or equal to 90%; cardiopulmonary status stable; nausea/vomiting adequately controlled; pain adequately controlled; no apparent anesthesia complications; patient discharged from anesthesia care when discharge criteria were met

## 2025-06-17 NOTE — PLAN OF CARE
Goal Outcome Evaluation:  Plan of Care Reviewed With: patient        Progress: improving        Patient admitted after EGD, plan Lap Kristen tomorrow

## 2025-06-17 NOTE — CONSULTS
"    GI CONSULT  NOTE:    Referring Provider: Dr. Manning    Chief complaint: Epigastric pain    Subjective .     History of present illness: Gerri Salcido is a 54 y.o. female  with a past medical history of nondysplastic Miller's esophagus, GERD, hiatal hernia, diverticulosis, hypertension, and tobacco use who presented to [facility] ED on 6/16/2025 with complaints of abdominal pain.  Thus, GI was consulted.  Patient reports intermittent sharp epigastric pain over the past 2 years which severely worsened 3 days ago.  Reports pain began to radiate to the back and shoulder.  Denies specific aggravating or alleviating factors.  Pain is not related to specific foods or eating.  Reports associated bloating.  Reports nausea with vomiting several days ago, which has since resolved.  No hematemesis or coffee-ground emesis.  Reports she is typically constipated at home and uses MiraLAX as needed.  Denies BRBPR or melena.  Reports she had restarted Mounjaro last month, she had previously been on Mounjaro x 9 months last year.  Denies a history of pancreatitis or pancreas issues.  Denies family medical history of pancreas issues or cancer.  Reports feeling hungry.  Weight is stable.  Denies any history of heavy alcohol use.  Drinks 1 alcoholic beverage every 1 to 2 months.  Smokes tobacco.  Reports taking ibuprofen \"like candy\" over the past several days due to abdominal pain, and she typically takes ibuprofen almost daily for joint pain.      Endo History:  4/2023 EGD/colonoscopy by Dr. Moreira-small hiatal hernia.  Nondysplastic Miller's.  Mild gastritis, negative for H. pylori.  Normal duodenum.  Moderate diverticulosis.  Erythema in the sigmoid with benign biopsy.  HP and mucosal polyps.  Internal hemorrhoids.    Past Medical History:  Past Medical History:   Diagnosis Date    GERD (gastroesophageal reflux disease)     Obesity        Past Surgical History:  Past Surgical History:   Procedure Laterality Date    PARTIAL KNEE " ARTHROPLASTY      TUBAL ABDOMINAL LIGATION         Social History:  Social History     Tobacco Use    Smoking status: Former     Current packs/day: 1.00     Types: Cigarettes     Passive exposure: Current    Smokeless tobacco: Never   Vaping Use    Vaping status: Never Used   Substance Use Topics    Alcohol use: Yes     Comment: socially    Drug use: Never       Family History:  Family History   Problem Relation Age of Onset    Heart disease Father        Medications:  (Not in a hospital admission)      Scheduled Meds:cefTRIAXone, 1,000 mg, Intravenous, Q24H  DULoxetine, 40 mg, Oral, Daily  metroNIDAZOLE, 500 mg, Intravenous, Q8H  nicotine, 1 patch, Transdermal, Nightly  pantoprazole, 40 mg, Intravenous, Q AM  [START ON 6/18/2025] polyethylene glycol, 17 g, Oral, Daily  sodium chloride, 10 mL, Intravenous, Q12H      Continuous Infusions:lactated ringers, 75 mL/hr, Last Rate: Stopped (06/17/25 1036)  lactated ringers, 125 mL/hr, Last Rate: 125 mL/hr (06/17/25 1035)      PRN Meds:.  senna-docusate sodium **AND** polyethylene glycol **AND** bisacodyl **AND** bisacodyl    Calcium Replacement - Follow Nurse / BPA Driven Protocol    hydrALAZINE    HYDROmorphone    Magnesium Standard Dose Replacement - Follow Nurse / BPA Driven Protocol    ondansetron    Phosphorus Replacement - Follow Nurse / BPA Driven Protocol    Potassium Replacement - Follow Nurse / BPA Driven Protocol    [COMPLETED] Insert Peripheral IV **AND** sodium chloride    sodium chloride    sodium chloride    ALLERGIES:  Percocet [oxycodone-acetaminophen]    ROS:  The following systems were reviewed and negative;   Constitution:  No fevers, chills, no unintentional weight loss  Skin: no rash, no jaundice  Eyes:  No blurry vision, no eye pain  HENT:  No change in hearing or smell  Resp:  No dyspnea or cough  CV:  No chest pain or palpitations  :  No dysuria, hematuria  Musculoskeletal:  No leg cramps or arthralgias  Neuro:  No tremor, no numbness  Psych:  No  depression or confusion    Objective     Vital Signs:   Vitals:    06/16/25 2001 06/16/25 2326 06/17/25 0514 06/17/25 0800   BP: 130/66  109/57 109/66   BP Location:   Left arm Left arm   Patient Position:    Lying   Pulse: 56   56   Resp:  20 15 18   Temp:   98 °F (36.7 °C) 97.8 °F (36.6 °C)   TempSrc:   Oral Oral   SpO2: 98%      Weight:       Height:           Physical Exam:       General Appearance:    Awake and alert, in no acute distress, lying in bed   Head:    Normocephalic, without obvious abnormality, atraumatic       Lungs:     Respirations regular, even and unlabored, room air   Chest Wall:    No abnormalities observed   Abdomen:     Soft, midepigastric tenderness, no rebound or guarding, non-distended, no hepatosplenomegaly       Extremities:   Moves all extremities, no edema, no cyanosis       Skin:   No rash, no jaundice       Neurologic:   Cranial nerves 2 - 12 grossly intact, no asterixis       Results Review:   I reviewed the patient's labs and imaging.  Results from last 7 days   Lab Units 06/17/25  0259 06/16/25  1555   WBC 10*3/mm3 7.91 7.57   HEMOGLOBIN g/dL 12.0 13.1   PLATELETS 10*3/mm3 253 264     Results from last 7 days   Lab Units 06/17/25  0259 06/16/25  1555   SODIUM mmol/L 139 138   POTASSIUM mmol/L 3.4* 3.8   CHLORIDE mmol/L 111* 105   CO2 mmol/L 21.6* 20.6*   BUN mg/dL 10.0 11.7   CREATININE mg/dL 0.56* 0.57   GLUCOSE mg/dL 87 94   ALBUMIN g/dL  --  4.4   BILIRUBIN mg/dL  --  0.6   ALK PHOS U/L  --  141*   AST (SGOT) U/L  --  70*   ALT (SGPT) U/L  --  158*   LIPASE U/L 165* 480*     Estimated Creatinine Clearance: 126.5 mL/min (A) (by C-G formula based on SCr of 0.56 mg/dL (L)).  Lab Results   Component Value Date    HGBA1C 5.54 06/16/2025    HGBA1C 5.50 05/30/2023         Infection     UA    Results from last 7 days   Lab Units 06/16/25  1548   NITRITE UA  Negative   WBC UA /HPF 0-2   BACTERIA UA /HPF 4+*   SQUAM EPITHEL UA /HPF 7-12*     Microbiology Results (last 10 days)        Procedure Component Value - Date/Time    COVID-19, FLU A/B, RSV PCR 1 HR TAT - Swab, Nasopharynx [[id number]]  (Normal) Collected: 06/16/25 2000    Lab Status: Final result Specimen: Swab from Nasopharynx Updated: 06/16/25 2047     COVID19 Not Detected     Influenza A PCR Not Detected     Influenza B PCR Not Detected     RSV, PCR Not Detected    Narrative:      Fact sheet for providers: https://www.fda.gov/media/972160/download    Fact sheet for patients: https://www.fda.gov/media/680466/download    Test performed by PCR.          Imaging Results (Last 72 Hours)       Procedure Component Value Units Date/Time    US Abdomen Limited [[id number]] Collected: 06/16/25 2229     Updated: 06/16/25 2235    Narrative:      US ABDOMEN LIMITED    Date of Exam: 6/16/2025 9:28 PM EDT    Indication: cholecystitis.    Comparison: Same day contrast-enhanced CT of the abdomen and pelvis.    Technique: Grayscale and color Doppler ultrasound evaluation of the right upper quadrant was performed.      Findings:    Limited study due to overlying bowel gas.    The liver appears normal in size and echogenicity. No focal lesions identified. Normal flow is present within the hepatic vasculature.    Multiple small gallstones are visualized. There is borderline gallbladder wall thickening measuring 0.3 cm. Gallbladder is partially contracted. No pericholecystic fluid is visualized. Positive Suggs sign was reported by the sonographer. Common bile   duct is within normal limits in caliber and measures 0.4 cm    The pancreas is not visualized due to overlying bowel gas.    The right kidney appears normal in size with no focal lesions. No evidence of nephrolithiasis or hydronephrosis. The right kidney measures 11.1 x 5.6 x 5.7 cm.           Impression:      Impression:    Cholelithiasis. Borderline gallbladder wall thickening. Positive Suggs sign was reported by the sonographer. Cannot exclude acute cholecystitis. Correlate with patient symptoms.  This can be further evaluated with HIDA scan as clinically warranted.        Electronically Signed: Marcus Newell MD    6/16/2025 10:33 PM EDT    Workstation ID: [id number]    CT Abdomen Pelvis With Contrast [[id number]] Collected: 06/16/25 1723     Updated: 06/16/25 1732    Narrative:      CT ABDOMEN PELVIS W CONTRAST    Date of Exam: 6/16/2025 4:45 PM EDT    Indication: epigastric and bilateral CVA pain.    Comparison: Contrast-enhanced CT of the abdomen pelvis performed on March 22, 2024    Technique: Axial CT images were obtained of the abdomen and pelvis following the uneventful intravenous administration of iodinated contrast. Sagittal and coronal reconstructions were performed.  Automated exposure control and iterative reconstruction   methods were used.        Findings:    Lung Bases:  No focal consolidation or effusion. Faintly calcified nodule in the left lower lobe appears stable when compared to prior CT, likely representing granuloma.    Peritoneum:  No free intraperitoneal air or fluid.     Abdominal wall:  Small fat-containing umbilical hernia is visualized.    Liver:  Liver is normal in size and contour. No focal lesions.    Biliary/Gallbladder:  The gallbladder is partially contracted. There is evidence of mild gallbladder wall thickening and pericholecystic edema versus trace fluid. No radiopaque gallstones are visualized. The biliary tree is nondilated.    Pancreas:  Pancreas is within normal limits. There is no evidence of pancreatic mass or peripancreatic inflammatory changes.    Spleen:  Spleen is normal in size and contour.    Gastrointestinal/Mesentery:   No evidence of bowel obstruction or gross inflammatory changes. The appendix appears within normal limits. There is sigmoid diverticulosis without evidence of diverticulitis.    Adrenals:  Adrenal glands are unremarkable.    Kidneys:  The kidneys are in anatomic position. No evidence of nephrolithiasis. No evidence of hydronephrosis or  significant perinephric fat stranding.    Bladder:   The urinary bladder is unremarkable.    Reproductive organs:    There is mild endometrial thickening for the patient's age measuring 8 mm. No adnexal masses are visualized.    Lymph Nodes:  No significant adenopathy is identified.     Vasculature:  Small scattered calcified plaques are visualized. The abdominal aorta is normal in caliber.    Osseous Structures:    No acute fracture or aggressive lesions.        Impression:      Impression:  1.Partially contracted gallbladder with mild gallbladder wall thickening and pericholecystic edema versus trace fluid. Correlate for symptoms of cholecystitis. Consider further evaluation with right upper quadrant ultrasound.  2.Mild endometrial thickening for the patient's age measuring 8 mm. Correlate with menstrual history. Consider further evaluation with pelvic ultrasound.        Electronically Signed: Marcus Newell MD    6/16/2025 5:30 PM EDT    Workstation ID: [id number]          Patient is a 54-year-old female with a past medical history of nondysplastic Miller's esophagus, GERD, hiatal hernia, diverticulosis, hypertension, and tobacco use who presented to [facility] ED on 6/16/2025 with complaints of abdominal pain.  Thus, GI was consulted.      ASSESSMENT:  Epigastric pain  Abnormal CT and RUQ US of gallbladder  Nausea and vomiting-resolved  Elevated LFTs  Elevated lipase  Obese-on Mounjaro  History of nondysplastic Miller's esophagus  Tobacco abuse    PLAN:  -Plan for EGD for further evaluation of epigastric pain today.  Possible PUD in the setting of NSAID use.  -GLP-1 likely exacerbating GI symptoms.   - General Surgery following-planning for cholecystectomy with IOC tomorrow afternoon 6/18/2025.  Also, thinking pericholecystic fluid is secondary to pancreatitis.  -CT A/P with contrast-partially contracted gallbladder with mild gallbladder wall thickening and pericholecystic edema versus trace fluid.  Mild endometrial  thickening of patient's age measuring 8 mm.  -RUQ US-cholelithiasis.  CBD 0.4 cm.  Borderline gallbladder wall thickening.  Positive Suggs sign.  Cannot exclude acute cholecystitis.  -Lipase 165 from 480.  No mention of pancreatitis on CT interpretation.  Continue to trend lipase  - N.p.o. until after EGD today.  - Start daily MiraLAX for constipation.  - Continue pantoprazole 40 mg daily for Miller's.  - Avoid NSAIDs.  - Alk phos 141, AST 70, , total bilirubin 0.6.  Not consistent with choledocholithiasis.    -LFTs normal in 4/2024.  Continue to trend LFTs.  Check liver serologies.  -Recommend complete tobacco cessation  - Supportive care.    I discussed the patients findings and my recommendations with the patient.    We appreciate the referral    Electronically signed by ABE Farmer, 06/17/25, 11:11 AM EDT.

## 2025-06-17 NOTE — CASE MANAGEMENT/SOCIAL WORK
Discharge Planning Assessment   Freddie     Patient Name: Gerri Salcido  MRN: [id number]  Today's Date: 6/17/2025    Admit Date: 6/16/2025    Plan: From home with spouse.   Discharge Needs Assessment       Row Name 06/17/25 0957       Living Environment    People in Home spouse    Name(s) of People in Home Nichole Alanis    Current Living Arrangements home    Potentially Unsafe Housing Conditions none    In the past 12 months has the electric, gas, oil, or water company threatened to shut off services in your home? No    Primary Care Provided by self    Provides Primary Care For no one    Family Caregiver if Needed spouse    Family Caregiver Names Nichole Alanis    Quality of Family Relationships helpful;involved;supportive    Able to Return to Prior Arrangements yes       Resource/Environmental Concerns    Resource/Environmental Concerns none    Transportation Concerns none       Transportation Needs    In the past 12 months, has lack of transportation kept you from medical appointments or from getting medications? no    In the past 12 months, has lack of transportation kept you from meetings, work, or from getting things needed for daily living? No       Food Insecurity    Within the past 12 months, you worried that your food would run out before you got the money to buy more. Never true    Within the past 12 months, the food you bought just didn't last and you didn't have money to get more. Never true       Transition Planning    Patient/Family Anticipates Transition to home with family    Patient/Family Anticipated Services at Transition none    Transportation Anticipated family or friend will provide       Discharge Needs Assessment    Readmission Within the Last 30 Days no previous admission in last 30 days    Equipment Currently Used at Home bp cuff    Concerns to be Addressed discharge planning    Anticipated Changes Related to Illness none    Equipment Needed After Discharge none                    Discharge Plan       Row Name 06/17/25 0958       Plan    Plan From home with spouse.    Patient/Family in Agreement with Plan yes    Plan Comments Patient lives at home with her spouse, Zeke. Patient does drive and her  will transport at [place]. Patient can do ADL. PCP (Suzanna) and pharmacy ([facility]) confirmed. Patient wishes to be enrolled in the [facility] program, CM updated in the chart. Denies finanical assistance needs with medications and/or food. Denies PT and/or HH needs. Discharge barriers: General surgery/GI following, IV abx, IV protonix, IV flagyl, IV fluids, electrolyte monitoring.                       Demographic Summary       Row Name 06/17/25 0956       General Information    Admission Type observation    Arrived From emergency department    Referral Source admission list    Reason for Consult discharge planning    Preferred Language English       Contact Information    Permission Granted to Share Info With                    Functional Status       Row Name 06/17/25 0956       Functional Status    Usual Activity Tolerance good    Current Activity Tolerance good       Functional Status, IADL    Medications independent    Meal Preparation independent    Housekeeping independent    Laundry independent    Shopping independent                 Met with patient in room wearing PPE: mask.    Maintained distance greater than six feet and spent less than 15 minutes in the room.       Quin Toscano RN

## 2025-06-18 ENCOUNTER — APPOINTMENT (OUTPATIENT)
Dept: GENERAL RADIOLOGY | Facility: HOSPITAL | Age: 55
DRG: 418 | End: 2025-06-18
Payer: COMMERCIAL

## 2025-06-18 ENCOUNTER — INPATIENT HOSPITAL (AMBULATORY)
Dept: URBAN - METROPOLITAN AREA HOSPITAL 84 | Facility: HOSPITAL | Age: 55
End: 2025-06-18
Payer: COMMERCIAL

## 2025-06-18 ENCOUNTER — ANESTHESIA (OUTPATIENT)
Dept: PERIOP | Facility: HOSPITAL | Age: 55
End: 2025-06-18
Payer: COMMERCIAL

## 2025-06-18 ENCOUNTER — ANESTHESIA EVENT (OUTPATIENT)
Dept: PERIOP | Facility: HOSPITAL | Age: 55
End: 2025-06-18
Payer: COMMERCIAL

## 2025-06-18 DIAGNOSIS — F17.200 NICOTINE DEPENDENCE, UNSPECIFIED, UNCOMPLICATED: ICD-10-CM

## 2025-06-18 DIAGNOSIS — R74.01 ELEVATION OF LEVELS OF LIVER TRANSAMINASE LEVELS: ICD-10-CM

## 2025-06-18 DIAGNOSIS — R10.13 EPIGASTRIC PAIN: ICD-10-CM

## 2025-06-18 DIAGNOSIS — R93.2 ABNORMAL FINDINGS ON DIAGNOSTIC IMAGING OF LIVER AND BILIARY: ICD-10-CM

## 2025-06-18 DIAGNOSIS — Z79.85 LONG-TERM (CURRENT) USE OF INJECTABLE NON-INSULIN ANTIDIABET: ICD-10-CM

## 2025-06-18 DIAGNOSIS — K31.89 OTHER DISEASES OF STOMACH AND DUODENUM: ICD-10-CM

## 2025-06-18 DIAGNOSIS — E66.01 MORBID (SEVERE) OBESITY DUE TO EXCESS CALORIES: ICD-10-CM

## 2025-06-18 DIAGNOSIS — K82.9 DISEASE OF GALLBLADDER, UNSPECIFIED: ICD-10-CM

## 2025-06-18 LAB
ABO GROUP BLD: NORMAL
ALBUMIN SERPL-MCNC: 3.7 G/DL (ref 3.5–5.2)
ALBUMIN/GLOB SERPL: 1.9 G/DL
ALP SERPL-CCNC: 102 U/L (ref 39–117)
ALT SERPL W P-5'-P-CCNC: 79 U/L (ref 1–33)
ANA SER QL: NEGATIVE
ANION GAP SERPL CALCULATED.3IONS-SCNC: 10.5 MMOL/L (ref 5–15)
APTT PPP: 33.1 SECONDS (ref 22.7–35.4)
AST SERPL-CCNC: 20 U/L (ref 1–32)
BASOPHILS # BLD AUTO: 0.03 10*3/MM3 (ref 0–0.2)
BASOPHILS NFR BLD AUTO: 0.5 % (ref 0–1.5)
BILIRUB SERPL-MCNC: 0.3 MG/DL (ref 0–1.2)
BLD GP AB SCN SERPL QL: NEGATIVE
BUN SERPL-MCNC: 7.6 MG/DL (ref 6–20)
BUN/CREAT SERPL: 15.2 (ref 7–25)
CALCIUM SPEC-SCNC: 8.7 MG/DL (ref 8.6–10.5)
CERULOPLASMIN SERPL-MCNC: 21 MG/DL (ref 19–39)
CHLORIDE SERPL-SCNC: 108 MMOL/L (ref 98–107)
CO2 SERPL-SCNC: 22.5 MMOL/L (ref 22–29)
CREAT SERPL-MCNC: 0.5 MG/DL (ref 0.57–1)
CRP SERPL-MCNC: 1.02 MG/DL (ref 0–0.5)
DEPRECATED RDW RBC AUTO: 42.5 FL (ref 37–54)
EGFRCR SERPLBLD CKD-EPI 2021: 113 ML/MIN/1.73
EOSINOPHIL # BLD AUTO: 0.29 10*3/MM3 (ref 0–0.4)
EOSINOPHIL NFR BLD AUTO: 4.4 % (ref 0.3–6.2)
ERYTHROCYTE [DISTWIDTH] IN BLOOD BY AUTOMATED COUNT: 12.4 % (ref 12.3–15.4)
GLOBULIN UR ELPH-MCNC: 2 GM/DL
GLUCOSE SERPL-MCNC: 74 MG/DL (ref 65–99)
HAV AB SER QL IA: POSITIVE
HCT VFR BLD AUTO: 36.9 % (ref 34–46.6)
HGB BLD-MCNC: 12.4 G/DL (ref 12–15.9)
HOLD SPECIMEN: NORMAL
IGA1 MFR SER: 199 MG/DL (ref 70–400)
IGG1 SER-MCNC: 701 MG/DL (ref 700–1600)
IGM SERPL-MCNC: 81 MG/DL (ref 40–230)
IMM GRANULOCYTES # BLD AUTO: 0.02 10*3/MM3 (ref 0–0.05)
IMM GRANULOCYTES NFR BLD AUTO: 0.3 % (ref 0–0.5)
INR PPP: 1.15 (ref 0.9–1.1)
LIPASE SERPL-CCNC: 78 U/L (ref 13–60)
LYMPHOCYTES # BLD AUTO: 2.79 10*3/MM3 (ref 0.7–3.1)
LYMPHOCYTES NFR BLD AUTO: 42.8 % (ref 19.6–45.3)
MCH RBC QN AUTO: 31 PG (ref 26.6–33)
MCHC RBC AUTO-ENTMCNC: 33.6 G/DL (ref 31.5–35.7)
MCV RBC AUTO: 92.3 FL (ref 79–97)
MONOCYTES # BLD AUTO: 0.49 10*3/MM3 (ref 0.1–0.9)
MONOCYTES NFR BLD AUTO: 7.5 % (ref 5–12)
NEUTROPHILS NFR BLD AUTO: 2.9 10*3/MM3 (ref 1.7–7)
NEUTROPHILS NFR BLD AUTO: 44.5 % (ref 42.7–76)
NRBC BLD AUTO-RTO: 0 /100 WBC (ref 0–0.2)
PLATELET # BLD AUTO: 273 10*3/MM3 (ref 140–450)
PMV BLD AUTO: 9.7 FL (ref 6–12)
POTASSIUM SERPL-SCNC: 3.8 MMOL/L (ref 3.5–5.2)
PROT SERPL-MCNC: 5.7 G/DL (ref 6–8.5)
PROTHROMBIN TIME: 14.7 SECONDS (ref 11.7–14.2)
RBC # BLD AUTO: 4 10*6/MM3 (ref 3.77–5.28)
RH BLD: POSITIVE
SODIUM SERPL-SCNC: 141 MMOL/L (ref 136–145)
T&S EXPIRATION DATE: NORMAL
WBC NRBC COR # BLD AUTO: 6.52 10*3/MM3 (ref 3.4–10.8)

## 2025-06-18 PROCEDURE — 25010000002 HYDROMORPHONE 1 MG/ML SOLUTION

## 2025-06-18 PROCEDURE — 25010000002 PROPOFOL 10 MG/ML EMULSION

## 2025-06-18 PROCEDURE — 88304 TISSUE EXAM BY PATHOLOGIST: CPT | Performed by: STUDENT IN AN ORGANIZED HEALTH CARE EDUCATION/TRAINING PROGRAM

## 2025-06-18 PROCEDURE — 71045 X-RAY EXAM CHEST 1 VIEW: CPT

## 2025-06-18 PROCEDURE — 25010000002 SUGAMMADEX 200 MG/2ML SOLUTION

## 2025-06-18 PROCEDURE — 47562 LAPAROSCOPIC CHOLECYSTECTOMY: CPT | Performed by: NURSE PRACTITIONER

## 2025-06-18 PROCEDURE — 25810000003 LACTATED RINGERS PER 1000 ML: Performed by: STUDENT IN AN ORGANIZED HEALTH CARE EDUCATION/TRAINING PROGRAM

## 2025-06-18 PROCEDURE — 25010000002 LIDOCAINE PF 2% 2 % SOLUTION

## 2025-06-18 PROCEDURE — 8E0W4CZ ROBOTIC ASSISTED PROCEDURE OF TRUNK REGION, PERCUTANEOUS ENDOSCOPIC APPROACH: ICD-10-PCS | Performed by: STUDENT IN AN ORGANIZED HEALTH CARE EDUCATION/TRAINING PROGRAM

## 2025-06-18 PROCEDURE — 47562 LAPAROSCOPIC CHOLECYSTECTOMY: CPT | Performed by: STUDENT IN AN ORGANIZED HEALTH CARE EDUCATION/TRAINING PROGRAM

## 2025-06-18 PROCEDURE — 25010000002 PHENYLEPHRINE 10 MG/ML SOLUTION 5 ML VIAL

## 2025-06-18 PROCEDURE — 25010000002 FENTANYL CITRATE (PF) 100 MCG/2ML SOLUTION

## 2025-06-18 PROCEDURE — 99232 SBSQ HOSP IP/OBS MODERATE 35: CPT | Performed by: PHYSICIAN ASSISTANT

## 2025-06-18 PROCEDURE — 25010000002 HYDROMORPHONE PER 4 MG

## 2025-06-18 PROCEDURE — 25010000002 ONDANSETRON PER 1 MG: Performed by: STUDENT IN AN ORGANIZED HEALTH CARE EDUCATION/TRAINING PROGRAM

## 2025-06-18 PROCEDURE — 25010000002 FENTANYL CITRATE (PF) 50 MCG/ML SOLUTION

## 2025-06-18 PROCEDURE — S2900 ROBOTIC SURGICAL SYSTEM: HCPCS | Performed by: STUDENT IN AN ORGANIZED HEALTH CARE EDUCATION/TRAINING PROGRAM

## 2025-06-18 PROCEDURE — 25810000003 LACTATED RINGERS PER 1000 ML

## 2025-06-18 PROCEDURE — 25010000002 ONDANSETRON PER 1 MG

## 2025-06-18 PROCEDURE — 25810000003 SODIUM CHLORIDE 0.9 % SOLUTION 250 ML FLEX CONT

## 2025-06-18 PROCEDURE — 25010000002 DEXAMETHASONE PER 1 MG

## 2025-06-18 PROCEDURE — 25010000002 INDOCYANINE GREEN 25 MG RECONSTITUTED SOLUTION: Performed by: STUDENT IN AN ORGANIZED HEALTH CARE EDUCATION/TRAINING PROGRAM

## 2025-06-18 PROCEDURE — 25010000002 METRONIDAZOLE 500 MG/100ML SOLUTION

## 2025-06-18 PROCEDURE — 25010000002 MIDAZOLAM PER 1 MG

## 2025-06-18 PROCEDURE — 25010000002 GLYCOPYRROLATE 0.2 MG/ML SOLUTION

## 2025-06-18 PROCEDURE — 0FT44ZZ RESECTION OF GALLBLADDER, PERCUTANEOUS ENDOSCOPIC APPROACH: ICD-10-PCS | Performed by: STUDENT IN AN ORGANIZED HEALTH CARE EDUCATION/TRAINING PROGRAM

## 2025-06-18 PROCEDURE — 25510000002 IOHEXOL 300 MG/ML SOLUTION: Performed by: STUDENT IN AN ORGANIZED HEALTH CARE EDUCATION/TRAINING PROGRAM

## 2025-06-18 DEVICE — CLIP LIG HEMOLOK PA LG 6CT PRP: Type: IMPLANTABLE DEVICE | Site: ABDOMEN | Status: FUNCTIONAL

## 2025-06-18 RX ORDER — NICOTINE 21 MG/24HR
1 PATCH, TRANSDERMAL 24 HOURS TRANSDERMAL EVERY 24 HOURS
Status: DISCONTINUED | OUTPATIENT
Start: 2025-06-19 | End: 2025-06-19 | Stop reason: HOSPADM

## 2025-06-18 RX ORDER — MIDAZOLAM HYDROCHLORIDE 1 MG/ML
INJECTION, SOLUTION INTRAMUSCULAR; INTRAVENOUS AS NEEDED
Status: DISCONTINUED | OUTPATIENT
Start: 2025-06-18 | End: 2025-06-18 | Stop reason: SURG

## 2025-06-18 RX ORDER — EPHEDRINE SULFATE 5 MG/ML
INJECTION INTRAVENOUS AS NEEDED
Status: DISCONTINUED | OUTPATIENT
Start: 2025-06-18 | End: 2025-06-18 | Stop reason: SURG

## 2025-06-18 RX ORDER — INDOCYANINE GREEN AND WATER 25 MG
2.5 KIT INJECTION ONCE
Status: COMPLETED | OUTPATIENT
Start: 2025-06-18 | End: 2025-06-18

## 2025-06-18 RX ORDER — FENTANYL CITRATE 50 UG/ML
50 INJECTION, SOLUTION INTRAMUSCULAR; INTRAVENOUS
Status: DISCONTINUED | OUTPATIENT
Start: 2025-06-18 | End: 2025-06-18 | Stop reason: HOSPADM

## 2025-06-18 RX ORDER — ONDANSETRON 4 MG/1
4 TABLET, FILM COATED ORAL DAILY PRN
Qty: 30 TABLET | Refills: 0 | Status: SHIPPED | OUTPATIENT
Start: 2025-06-18 | End: 2026-06-18

## 2025-06-18 RX ORDER — FENTANYL CITRATE 50 UG/ML
INJECTION, SOLUTION INTRAMUSCULAR; INTRAVENOUS AS NEEDED
Status: DISCONTINUED | OUTPATIENT
Start: 2025-06-18 | End: 2025-06-18 | Stop reason: SURG

## 2025-06-18 RX ORDER — HYDRALAZINE HYDROCHLORIDE 20 MG/ML
5 INJECTION INTRAMUSCULAR; INTRAVENOUS
Status: DISCONTINUED | OUTPATIENT
Start: 2025-06-18 | End: 2025-06-18 | Stop reason: HOSPADM

## 2025-06-18 RX ORDER — ONDANSETRON 2 MG/ML
4 INJECTION INTRAMUSCULAR; INTRAVENOUS EVERY 6 HOURS PRN
Status: DISCONTINUED | OUTPATIENT
Start: 2025-06-18 | End: 2025-06-19 | Stop reason: HOSPADM

## 2025-06-18 RX ORDER — DIPHENHYDRAMINE HYDROCHLORIDE 50 MG/ML
12.5 INJECTION, SOLUTION INTRAMUSCULAR; INTRAVENOUS ONCE AS NEEDED
Status: DISCONTINUED | OUTPATIENT
Start: 2025-06-18 | End: 2025-06-18 | Stop reason: HOSPADM

## 2025-06-18 RX ORDER — DIPHENHYDRAMINE HYDROCHLORIDE 50 MG/ML
12.5 INJECTION, SOLUTION INTRAMUSCULAR; INTRAVENOUS
Status: DISCONTINUED | OUTPATIENT
Start: 2025-06-18 | End: 2025-06-18 | Stop reason: HOSPADM

## 2025-06-18 RX ORDER — DEXMEDETOMIDINE HYDROCHLORIDE 100 UG/ML
INJECTION, SOLUTION INTRAVENOUS AS NEEDED
Status: DISCONTINUED | OUTPATIENT
Start: 2025-06-18 | End: 2025-06-18 | Stop reason: SURG

## 2025-06-18 RX ORDER — GLYCOPYRROLATE 0.2 MG/ML
INJECTION INTRAMUSCULAR; INTRAVENOUS AS NEEDED
Status: DISCONTINUED | OUTPATIENT
Start: 2025-06-18 | End: 2025-06-18 | Stop reason: SURG

## 2025-06-18 RX ORDER — HYDROCODONE BITARTRATE AND ACETAMINOPHEN 5; 325 MG/1; MG/1
1 TABLET ORAL ONCE AS NEEDED
Status: COMPLETED | OUTPATIENT
Start: 2025-06-18 | End: 2025-06-18

## 2025-06-18 RX ORDER — LABETALOL HYDROCHLORIDE 5 MG/ML
5 INJECTION, SOLUTION INTRAVENOUS
Status: DISCONTINUED | OUTPATIENT
Start: 2025-06-18 | End: 2025-06-18 | Stop reason: HOSPADM

## 2025-06-18 RX ORDER — FLUMAZENIL 0.1 MG/ML
0.2 INJECTION INTRAVENOUS AS NEEDED
Status: DISCONTINUED | OUTPATIENT
Start: 2025-06-18 | End: 2025-06-18 | Stop reason: HOSPADM

## 2025-06-18 RX ORDER — NALOXONE HCL 0.4 MG/ML
0.4 VIAL (ML) INJECTION AS NEEDED
Status: DISCONTINUED | OUTPATIENT
Start: 2025-06-18 | End: 2025-06-18 | Stop reason: HOSPADM

## 2025-06-18 RX ORDER — ACETAMINOPHEN 325 MG/1
650 TABLET ORAL EVERY 6 HOURS PRN
Status: DISCONTINUED | OUTPATIENT
Start: 2025-06-18 | End: 2025-06-19 | Stop reason: HOSPADM

## 2025-06-18 RX ORDER — HYDROMORPHONE HYDROCHLORIDE 1 MG/ML
0.5 INJECTION, SOLUTION INTRAMUSCULAR; INTRAVENOUS; SUBCUTANEOUS
Status: DISCONTINUED | OUTPATIENT
Start: 2025-06-18 | End: 2025-06-18 | Stop reason: HOSPADM

## 2025-06-18 RX ORDER — IPRATROPIUM BROMIDE AND ALBUTEROL SULFATE 2.5; .5 MG/3ML; MG/3ML
3 SOLUTION RESPIRATORY (INHALATION) ONCE AS NEEDED
Status: DISCONTINUED | OUTPATIENT
Start: 2025-06-18 | End: 2025-06-18 | Stop reason: HOSPADM

## 2025-06-18 RX ORDER — LIDOCAINE HYDROCHLORIDE 20 MG/ML
INJECTION, SOLUTION EPIDURAL; INFILTRATION; INTRACAUDAL; PERINEURAL AS NEEDED
Status: DISCONTINUED | OUTPATIENT
Start: 2025-06-18 | End: 2025-06-18 | Stop reason: SURG

## 2025-06-18 RX ORDER — EPHEDRINE SULFATE 5 MG/ML
5 INJECTION INTRAVENOUS ONCE AS NEEDED
Status: DISCONTINUED | OUTPATIENT
Start: 2025-06-18 | End: 2025-06-18 | Stop reason: HOSPADM

## 2025-06-18 RX ORDER — TRAMADOL HYDROCHLORIDE 50 MG/1
50 TABLET ORAL EVERY 4 HOURS PRN
Status: DISCONTINUED | OUTPATIENT
Start: 2025-06-18 | End: 2025-06-19 | Stop reason: HOSPADM

## 2025-06-18 RX ORDER — SODIUM CHLORIDE, SODIUM LACTATE, POTASSIUM CHLORIDE, CALCIUM CHLORIDE 600; 310; 30; 20 MG/100ML; MG/100ML; MG/100ML; MG/100ML
INJECTION, SOLUTION INTRAVENOUS CONTINUOUS PRN
Status: DISCONTINUED | OUTPATIENT
Start: 2025-06-18 | End: 2025-06-18 | Stop reason: SURG

## 2025-06-18 RX ORDER — PROPOFOL 10 MG/ML
VIAL (ML) INTRAVENOUS AS NEEDED
Status: DISCONTINUED | OUTPATIENT
Start: 2025-06-18 | End: 2025-06-18 | Stop reason: SURG

## 2025-06-18 RX ORDER — ONDANSETRON 2 MG/ML
INJECTION INTRAMUSCULAR; INTRAVENOUS AS NEEDED
Status: DISCONTINUED | OUTPATIENT
Start: 2025-06-18 | End: 2025-06-18 | Stop reason: SURG

## 2025-06-18 RX ORDER — DEXAMETHASONE SODIUM PHOSPHATE 4 MG/ML
INJECTION, SOLUTION INTRA-ARTICULAR; INTRALESIONAL; INTRAMUSCULAR; INTRAVENOUS; SOFT TISSUE AS NEEDED
Status: DISCONTINUED | OUTPATIENT
Start: 2025-06-18 | End: 2025-06-18 | Stop reason: SURG

## 2025-06-18 RX ORDER — TRAMADOL HYDROCHLORIDE 50 MG/1
50 TABLET ORAL EVERY 6 HOURS PRN
Qty: 15 TABLET | Refills: 0 | Status: SHIPPED | OUTPATIENT
Start: 2025-06-18 | End: 2025-06-22

## 2025-06-18 RX ORDER — ROCURONIUM BROMIDE 10 MG/ML
INJECTION, SOLUTION INTRAVENOUS AS NEEDED
Status: DISCONTINUED | OUTPATIENT
Start: 2025-06-18 | End: 2025-06-18 | Stop reason: SURG

## 2025-06-18 RX ORDER — TRAMADOL HYDROCHLORIDE 50 MG/1
100 TABLET ORAL EVERY 4 HOURS PRN
Status: DISCONTINUED | OUTPATIENT
Start: 2025-06-18 | End: 2025-06-19 | Stop reason: HOSPADM

## 2025-06-18 RX ORDER — ONDANSETRON 2 MG/ML
4 INJECTION INTRAMUSCULAR; INTRAVENOUS ONCE AS NEEDED
Status: COMPLETED | OUTPATIENT
Start: 2025-06-18 | End: 2025-06-18

## 2025-06-18 RX ORDER — HYDROCODONE BITARTRATE AND ACETAMINOPHEN 10; 325 MG/1; MG/1
1 TABLET ORAL EVERY 4 HOURS PRN
Status: DISCONTINUED | OUTPATIENT
Start: 2025-06-18 | End: 2025-06-18 | Stop reason: HOSPADM

## 2025-06-18 RX ADMIN — HYDROMORPHONE HYDROCHLORIDE 0.5 MG: 1 INJECTION, SOLUTION INTRAMUSCULAR; INTRAVENOUS; SUBCUTANEOUS at 00:36

## 2025-06-18 RX ADMIN — NICOTINE 1 PATCH: 21 PATCH TRANSDERMAL at 08:30

## 2025-06-18 RX ADMIN — SUGAMMADEX 200 MG: 100 INJECTION, SOLUTION INTRAVENOUS at 15:11

## 2025-06-18 RX ADMIN — ONDANSETRON 4 MG: 2 INJECTION, SOLUTION INTRAMUSCULAR; INTRAVENOUS at 21:55

## 2025-06-18 RX ADMIN — EPHEDRINE SULFATE 15 MG: 5 INJECTION INTRAVENOUS at 14:23

## 2025-06-18 RX ADMIN — ROCURONIUM BROMIDE 10 MG: 10 INJECTION, SOLUTION INTRAVENOUS at 14:40

## 2025-06-18 RX ADMIN — HYDROMORPHONE HYDROCHLORIDE 0.5 MG: 1 INJECTION, SOLUTION INTRAMUSCULAR; INTRAVENOUS; SUBCUTANEOUS at 14:37

## 2025-06-18 RX ADMIN — Medication 5 MG: at 23:54

## 2025-06-18 RX ADMIN — HYDROCODONE BITARTRATE AND ACETAMINOPHEN 1 TABLET: 5; 325 TABLET ORAL at 16:00

## 2025-06-18 RX ADMIN — SODIUM CHLORIDE, SODIUM LACTATE, POTASSIUM CHLORIDE, AND CALCIUM CHLORIDE: .6; .31; .03; .02 INJECTION, SOLUTION INTRAVENOUS at 14:13

## 2025-06-18 RX ADMIN — ONDANSETRON 4 MG: 2 INJECTION INTRAMUSCULAR; INTRAVENOUS at 14:49

## 2025-06-18 RX ADMIN — DEXMEDETOMIDINE 12 MCG: 200 INJECTION, SOLUTION INTRAVENOUS at 13:42

## 2025-06-18 RX ADMIN — METRONIDAZOLE 500 MG: 500 INJECTION, SOLUTION INTRAVENOUS at 05:07

## 2025-06-18 RX ADMIN — HYDROMORPHONE HYDROCHLORIDE 0.5 MG: 1 INJECTION, SOLUTION INTRAMUSCULAR; INTRAVENOUS; SUBCUTANEOUS at 13:54

## 2025-06-18 RX ADMIN — ONDANSETRON 4 MG: 2 INJECTION, SOLUTION INTRAMUSCULAR; INTRAVENOUS at 16:03

## 2025-06-18 RX ADMIN — FENTANYL CITRATE 50 MCG: 50 INJECTION, SOLUTION INTRAMUSCULAR; INTRAVENOUS at 15:45

## 2025-06-18 RX ADMIN — METRONIDAZOLE 500 MG: 500 INJECTION, SOLUTION INTRAVENOUS at 11:58

## 2025-06-18 RX ADMIN — CYCLOBENZAPRINE HYDROCHLORIDE 10 MG: 10 TABLET, FILM COATED ORAL at 19:50

## 2025-06-18 RX ADMIN — PANTOPRAZOLE SODIUM 40 MG: 40 INJECTION, POWDER, FOR SOLUTION INTRAVENOUS at 05:07

## 2025-06-18 RX ADMIN — DEXAMETHASONE SODIUM PHOSPHATE 8 MG: 4 INJECTION, SOLUTION INTRAMUSCULAR; INTRAVENOUS at 13:14

## 2025-06-18 RX ADMIN — MIDAZOLAM 2 MG: 1 INJECTION INTRAMUSCULAR; INTRAVENOUS at 13:08

## 2025-06-18 RX ADMIN — SODIUM CHLORIDE, POTASSIUM CHLORIDE, SODIUM LACTATE AND CALCIUM CHLORIDE 125 ML/HR: 600; 310; 30; 20 INJECTION, SOLUTION INTRAVENOUS at 08:46

## 2025-06-18 RX ADMIN — DEXMEDETOMIDINE 8 MCG: 200 INJECTION, SOLUTION INTRAVENOUS at 13:34

## 2025-06-18 RX ADMIN — ROCURONIUM BROMIDE 50 MG: 10 INJECTION, SOLUTION INTRAVENOUS at 13:14

## 2025-06-18 RX ADMIN — HYDROMORPHONE HYDROCHLORIDE 0.5 MG: 1 INJECTION, SOLUTION INTRAMUSCULAR; INTRAVENOUS; SUBCUTANEOUS at 05:59

## 2025-06-18 RX ADMIN — FENTANYL CITRATE 100 MCG: 50 INJECTION, SOLUTION INTRAMUSCULAR; INTRAVENOUS at 15:19

## 2025-06-18 RX ADMIN — INDOCYANINE GREEN AND WATER 2.5 MG: KIT at 12:32

## 2025-06-18 RX ADMIN — PROPOFOL 150 MG: 10 INJECTION, EMULSION INTRAVENOUS at 13:14

## 2025-06-18 RX ADMIN — Medication 10 ML: at 19:47

## 2025-06-18 RX ADMIN — SODIUM CHLORIDE, SODIUM LACTATE, POTASSIUM CHLORIDE, AND CALCIUM CHLORIDE: .6; .31; .03; .02 INJECTION, SOLUTION INTRAVENOUS at 13:09

## 2025-06-18 RX ADMIN — FENTANYL CITRATE 100 MCG: 50 INJECTION, SOLUTION INTRAMUSCULAR; INTRAVENOUS at 13:11

## 2025-06-18 RX ADMIN — ROCURONIUM BROMIDE 20 MG: 10 INJECTION, SOLUTION INTRAVENOUS at 13:37

## 2025-06-18 RX ADMIN — GLYCOPYRROLATE 0.2 MG: 0.2 INJECTION, SOLUTION INTRAMUSCULAR; INTRAVENOUS at 13:36

## 2025-06-18 RX ADMIN — PHENYLEPHRINE HYDROCHLORIDE 0.5 MCG/KG/MIN: 10 INJECTION INTRAVENOUS at 13:34

## 2025-06-18 RX ADMIN — LIDOCAINE HYDROCHLORIDE 100 MG: 20 INJECTION, SOLUTION EPIDURAL; INFILTRATION; INTRACAUDAL; PERINEURAL at 13:14

## 2025-06-18 RX ADMIN — ROCURONIUM BROMIDE 10 MG: 10 INJECTION, SOLUTION INTRAVENOUS at 14:10

## 2025-06-18 RX ADMIN — FENTANYL CITRATE 50 MCG: 50 INJECTION, SOLUTION INTRAMUSCULAR; INTRAVENOUS at 16:00

## 2025-06-18 NOTE — BRIEF OP NOTE
CHOLECYSTECTOMY LAPAROSCOPIC INTRAOPERATIVE CHOLANGIOGRAM WITH DAVINCI ROBOT  Progress Note    Gerri Salcido  6/18/2025    Pre-op Diagnosis:   Gallstone pancreatitis [K85.10]       Post-Op Diagnosis Codes:     * Gallstone pancreatitis [K85.10]    Procedure(s):      Procedure(s):  CHOLECYSTECTOMY LAPAROSCOPIC INTRAOPERATIVE CHOLANGIOGRAM WITH DAVINCI ROBOT      Surgeon(s):  Abel Kaplan MD    Anesthesia: General with Block    Staff:   Circulator: Rylee King RN; Vivi Abel RN  Scrub Person: Chrissie Zhao  Assistant: Nona Portillo APRN  Assistant: Nona Portillo APRN    Estimated Blood Loss: minimal    Urine Voided: * No values recorded between 6/18/2025  1:09 PM and 6/18/2025  2:51 PM *    Specimens:                Specimens       ID Source Type Tests Collected By Collected At Frozen?    A Gallbladder Tissue TISSUE PATHOLOGY EXAM   Abel Kaplan MD 6/18/25 7769     This specimen was not marked as sent.              Drains: * No LDAs found *    Findings: as above       Complications: unable to complete IOC     Assistant: Nona Portillo APRN  was responsible for performing the following activities: Retraction, Suction, Irrigation, Suturing, and Closing and their skilled assistance was necessary for the success of this case.    Abel Kaplan MD     Date: 6/18/2025  Time: 14:51 EDT

## 2025-06-18 NOTE — ANESTHESIA POSTPROCEDURE EVALUATION
Patient: Gerri Salcido    Procedure Summary       Date: 06/18/25 Room / Location: [facility] OR 09 / [facility]IN OR    Anesthesia Start: 1309 Anesthesia Stop: 1528    Procedure: CHOLECYSTECTOMY LAPAROSCOPIC ATTEMPTED CHOLANGIOGRAM WITH DAVINCI ROBOT (Abdomen) Diagnosis:       Gallstone pancreatitis      (Gallstone pancreatitis [K85.10])    Surgeons: Abel Kaplan MD Provider: Ajith Costa MD    Anesthesia Type: general, general with block ASA Status: 2            Anesthesia Type: general, general with block    Vitals  Vitals Value Taken Time   /54 06/18/25 16:02   Temp     Pulse 69 06/18/25 16:04   Resp 12 06/18/25 15:53   SpO2 94 % 06/18/25 16:04   Vitals shown include unfiled device data.        Post Anesthesia Care and Evaluation    Patient location during evaluation: PACU  Patient participation: complete - patient participated  Level of consciousness: awake  Pain scale: See nurse's notes for pain score.  Pain management: adequate    Airway patency: patent  Anesthetic complications: No anesthetic complications  PONV Status: none  Cardiovascular status: acceptable  Respiratory status: acceptable and spontaneous ventilation  Hydration status: acceptable    Comments: Patient seen and examined postoperatively; vital signs stable; SpO2 greater than or equal to 90%; cardiopulmonary status stable; nausea/vomiting adequately controlled; pain adequately controlled; no apparent anesthesia complications; patient discharged from anesthesia care when discharge criteria were met

## 2025-06-18 NOTE — PROGRESS NOTES
LOS: 0 days   Patient Care Team:  Karin Briseno MD as PCP - General (Family Medicine)  Provider, No Known as PCP - Family Medicine      Subjective     Interval History:     Subjective: Patient reports that her abdominal pain is improved with the pain medication.  She denies any nausea or vomiting at this time.  Surgery has recommended cholecystectomy.      ROS:   No chest pain, shortness of breath, or cough.        Medication Review:     Current Facility-Administered Medications:     sennosides-docusate (PERICOLACE) 8.6-50 MG per tablet 2 tablet, 2 tablet, Oral, BID PRN **AND** polyethylene glycol (MIRALAX) packet 17 g, 17 g, Oral, Daily PRN **AND** bisacodyl (DULCOLAX) EC tablet 5 mg, 5 mg, Oral, Daily PRN **AND** bisacodyl (DULCOLAX) suppository 10 mg, 10 mg, Rectal, Daily PRN, Brie Blanco N, APRN    Calcium Replacement - Follow Nurse / BPA Driven Protocol, , Not Applicable, PRN, Brie Blanco, APRN    cefTRIAXone (ROCEPHIN) 1,000 mg in sodium chloride 0.9 % 100 mL MBP, 1,000 mg, Intravenous, Q24H, Brie Blanco, APRN, Last Rate: 200 mL/hr at 06/17/25 2048, 1,000 mg at 06/17/25 2048    cyclobenzaprine (FLEXERIL) tablet 10 mg, 10 mg, Oral, TID PRN, Elizabeth Manning MD    DULoxetine (CYMBALTA) DR capsule 40 mg, 40 mg, Oral, Daily, Brie Blanco N, APRN, 40 mg at 06/17/25 0831    hydrALAZINE (APRESOLINE) injection 10 mg, 10 mg, Intravenous, Q6H PRN, Brie Blanco N, APRN    HYDROmorphone (DILAUDID) injection 0.5 mg, 0.5 mg, Intravenous, Q4H PRN, Brie Blanco N, APRN, 0.5 mg at 06/18/25 0559    lactated ringers infusion, 125 mL/hr, Intravenous, Continuous, Abel Kaplan MD, Last Rate: 125 mL/hr at 06/18/25 0846, 125 mL/hr at 06/18/25 0846    Magnesium Standard Dose Replacement - Follow Nurse / BPA Driven Protocol, , Not Applicable, PRN, Brie Blanco, APRN    metroNIDAZOLE (FLAGYL) IVPB 500 mg, 500 mg, Intravenous, Q8H, Brie Blanco, APRN, Last Rate: 200 mL/hr at 06/18/25 0507, 500 mg at 06/18/25  0507    nicotine (NICODERM CQ) 21 MG/24HR patch 1 patch, 1 patch, Transdermal, Q24H, Elizabeth Manning MD, 1 patch at 06/18/25 0830    ondansetron (ZOFRAN) injection 4 mg, 4 mg, Intravenous, Q6H PRN, Brie Blanco N, APRN    pantoprazole (PROTONIX) injection 40 mg, 40 mg, Intravenous, Q AM, Brie Blanco N, APRN, 40 mg at 06/18/25 0507    Phosphorus Replacement - Follow Nurse / BPA Driven Protocol, , Not Applicable, PRN, Brie Blanco N, APRN    polyethylene glycol (MIRALAX) packet 17 g, 17 g, Oral, Daily, Moon Garcia, ABE    Potassium Replacement - Follow Nurse / BPA Driven Protocol, , Not Applicable, PRN, Brie Blanco N, APRN    [COMPLETED] Insert Peripheral IV, , , Once **AND** sodium chloride 0.9 % flush 10 mL, 10 mL, Intravenous, PRN, Herve Hughes PA-C    sodium chloride 0.9 % flush 10 mL, 10 mL, Intravenous, Q12H, Brie Blanco N, APRN, 10 mL at 06/17/25 2245    sodium chloride 0.9 % flush 10 mL, 10 mL, Intravenous, PRN, Brie Blanco N, APRN    sodium chloride 0.9 % infusion 40 mL, 40 mL, Intravenous, PRN, Brie Blanco N, APRN      Objective     Vital Signs  Vitals:    06/17/25 2011 06/18/25 0439 06/18/25 0815 06/18/25 1136   BP: 114/60 109/69 130/78 116/74   BP Location: Left arm Left arm Left arm Left arm   Patient Position: Lying Lying Lying Lying   Pulse: 58 55 71 69   Resp: 16 16 16 16   Temp: 97.6 °F (36.4 °C) 97.7 °F (36.5 °C) 97.5 °F (36.4 °C) 97.7 °F (36.5 °C)   TempSrc: Oral Oral Oral Oral   SpO2: 97% 97%  99%   Weight:       Height:           Physical Exam:     General Appearance:    Awake and alert, in no acute distress   Head:    Normocephalic, without obvious abnormality   Eyes:          Conjunctivae normal, anicteric sclera           Lungs:     respirations regular, even and unlabored   Abdomen:     Soft, non-tender, no rebound or guarding, non-distended       Extremities:   No edema, no cyanosis   Skin:   No bruising or rash, no jaundice        Results Review:    Results from last 7  days   Lab Units 06/17/25  2338 06/17/25  0259 06/16/25  1555   WBC 10*3/mm3 6.52 7.91 7.57   HEMOGLOBIN g/dL 12.4 12.0 13.1   PLATELETS 10*3/mm3 273 253 264     Results from last 7 days   Lab Units 06/17/25  2338 06/17/25  0259 06/16/25  1555   SODIUM mmol/L 141 139 138   POTASSIUM mmol/L 3.8 3.4* 3.8   CHLORIDE mmol/L 108* 111* 105   CO2 mmol/L 22.5 21.6* 20.6*   BUN mg/dL 7.6 10.0 11.7   CREATININE mg/dL 0.50* 0.56* 0.57   GLUCOSE mg/dL 74 87 94   ALBUMIN g/dL 3.7  --  4.4   BILIRUBIN mg/dL 0.3  --  0.6   ALK PHOS U/L 102  --  141*   AST (SGOT) U/L 20  --  70*   ALT (SGPT) U/L 79*  --  158*   INR  1.15*  --   --    APTT seconds 33.1  --   --    LIPASE U/L 78* 165* 480*   CRP mg/dL 1.02*  --   --      Estimated Creatinine Clearance: 141.7 mL/min (A) (by C-G formula based on SCr of 0.5 mg/dL (L)).  Lab Results   Component Value Date    HGBA1C 5.54 06/16/2025    HGBA1C 5.50 05/30/2023         Infection     UA    Results from last 7 days   Lab Units 06/16/25  1548   NITRITE UA  Negative   WBC UA /HPF 0-2   BACTERIA UA /HPF 4+*   SQUAM EPITHEL UA /HPF 7-12*     Microbiology Results (last 10 days)       Procedure Component Value - Date/Time    COVID-19, FLU A/B, RSV PCR 1 HR TAT - Swab, Nasopharynx [[id number]]  (Normal) Collected: 06/16/25 2000    Lab Status: Final result Specimen: Swab from Nasopharynx Updated: 06/16/25 2047     COVID19 Not Detected     Influenza A PCR Not Detected     Influenza B PCR Not Detected     RSV, PCR Not Detected    Narrative:      Fact sheet for providers: https://www.fda.gov/media/438807/download    Fact sheet for patients: https://www.fda.gov/media/020572/download    Test performed by PCR.          Imaging Results (Last 72 Hours)       Procedure Component Value Units Date/Time    XR Chest 1 View [[id number]] Collected: 06/18/25 0713     Updated: 06/18/25 0716    Narrative:      XR CHEST 1 VW    Date of Exam: 6/18/2025 5:45 AM EDT    Indication: Pre-op    Comparison:  7/18/2020    Findings:  The cardiomediastinal silhouette is within normal limits. Lungs are clear. No focal consolidation, pneumothorax, or significant pleural effusion. Osseous structures grossly intact.      Impression:      Impression:  No acute process.          Electronically Signed: Sonu Tneorio MD    6/18/2025 7:14 AM EDT    Workstation ID: [id number]    US Abdomen Limited [[id number]] Collected: 06/16/25 2229     Updated: 06/16/25 2235    Narrative:      US ABDOMEN LIMITED    Date of Exam: 6/16/2025 9:28 PM EDT    Indication: cholecystitis.    Comparison: Same day contrast-enhanced CT of the abdomen and pelvis.    Technique: Grayscale and color Doppler ultrasound evaluation of the right upper quadrant was performed.      Findings:    Limited study due to overlying bowel gas.    The liver appears normal in size and echogenicity. No focal lesions identified. Normal flow is present within the hepatic vasculature.    Multiple small gallstones are visualized. There is borderline gallbladder wall thickening measuring 0.3 cm. Gallbladder is partially contracted. No pericholecystic fluid is visualized. Positive Suggs sign was reported by the sonographer. Common bile   duct is within normal limits in caliber and measures 0.4 cm    The pancreas is not visualized due to overlying bowel gas.    The right kidney appears normal in size with no focal lesions. No evidence of nephrolithiasis or hydronephrosis. The right kidney measures 11.1 x 5.6 x 5.7 cm.           Impression:      Impression:    Cholelithiasis. Borderline gallbladder wall thickening. Positive Suggs sign was reported by the sonographer. Cannot exclude acute cholecystitis. Correlate with patient symptoms. This can be further evaluated with HIDA scan as clinically warranted.        Electronically Signed: Marcus Newell MD    6/16/2025 10:33 PM EDT    Workstation ID: [id number]    CT Abdomen Pelvis With Contrast [[id number]] Collected: 06/16/25 1723     Updated:  06/16/25 1732    Narrative:      CT ABDOMEN PELVIS W CONTRAST    Date of Exam: 6/16/2025 4:45 PM EDT    Indication: epigastric and bilateral CVA pain.    Comparison: Contrast-enhanced CT of the abdomen pelvis performed on March 22, 2024    Technique: Axial CT images were obtained of the abdomen and pelvis following the uneventful intravenous administration of iodinated contrast. Sagittal and coronal reconstructions were performed.  Automated exposure control and iterative reconstruction   methods were used.        Findings:    Lung Bases:  No focal consolidation or effusion. Faintly calcified nodule in the left lower lobe appears stable when compared to prior CT, likely representing granuloma.    Peritoneum:  No free intraperitoneal air or fluid.     Abdominal wall:  Small fat-containing umbilical hernia is visualized.    Liver:  Liver is normal in size and contour. No focal lesions.    Biliary/Gallbladder:  The gallbladder is partially contracted. There is evidence of mild gallbladder wall thickening and pericholecystic edema versus trace fluid. No radiopaque gallstones are visualized. The biliary tree is nondilated.    Pancreas:  Pancreas is within normal limits. There is no evidence of pancreatic mass or peripancreatic inflammatory changes.    Spleen:  Spleen is normal in size and contour.    Gastrointestinal/Mesentery:   No evidence of bowel obstruction or gross inflammatory changes. The appendix appears within normal limits. There is sigmoid diverticulosis without evidence of diverticulitis.    Adrenals:  Adrenal glands are unremarkable.    Kidneys:  The kidneys are in anatomic position. No evidence of nephrolithiasis. No evidence of hydronephrosis or significant perinephric fat stranding.    Bladder:   The urinary bladder is unremarkable.    Reproductive organs:    There is mild endometrial thickening for the patient's age measuring 8 mm. No adnexal masses are visualized.    Lymph Nodes:  No significant  adenopathy is identified.     Vasculature:  Small scattered calcified plaques are visualized. The abdominal aorta is normal in caliber.    Osseous Structures:    No acute fracture or aggressive lesions.        Impression:      Impression:  1.Partially contracted gallbladder with mild gallbladder wall thickening and pericholecystic edema versus trace fluid. Correlate for symptoms of cholecystitis. Consider further evaluation with right upper quadrant ultrasound.  2.Mild endometrial thickening for the patient's age measuring 8 mm. Correlate with menstrual history. Consider further evaluation with pelvic ultrasound.        Electronically Signed: Marcus Newell MD    6/16/2025 5:30 PM EDT    Workstation ID: [id number]            Assessment & Plan       Patient is a 54-year-old female with a past medical history of nondysplastic Miller's esophagus, GERD, hiatal hernia, diverticulosis, hypertension, and tobacco use who presented to [facility] ED on 6/16/2025 with complaints of abdominal pain.       ASSESSMENT:  Epigastric pain  Abnormal CT and RUQ US of gallbladder  Cholelithiasis  Nausea and vomiting-resolved  Elevated LFTs-improving  Elevated lipase-improving  Obese-on Mounjaro  History of nondysplastic Miller's esophagus  Tobacco abuse     PLAN:  - EGD yesterday 6/17 showed mild erythema in the stomach consistent with gastritis and medium sized sliding hiatal hernia, otherwise normal.  - GLP-1 likely exacerbating GI symptoms.   - General Surgery planning for cholecystectomy with IOC this afternoon 6/18/2025.  Also, thinking pericholecystic fluid is secondary to pancreatitis.  - CT A/P with contrast-partially contracted gallbladder with mild gallbladder wall thickening and pericholecystic edema versus trace fluid.  Mild endometrial thickening of patient's age measuring 8 mm.  - RUQ US-cholelithiasis.  CBD 0.4 cm.  Borderline gallbladder wall thickening.  Positive Suggs sign.  - Lipase improved to 78 from 165.  Was 480 on  admission.  No mention of pancreatitis on CT interpretation.  Continue to trend lipase  - Continue daily MiraLAX for constipation.  - Continue pantoprazole 40 mg daily for Miller's.  - Avoid NSAIDs.  - Alk phos 102 from 141, AST 20 from 70, ALT 79 from 158, total bilirubin 0.3 from 0.6.  Not consistent with choledocholithiasis.  .  - LFTs normal in 4/2024.  Continue to trend LFTs.   - Hep A total antibody positive with negative hep A IgM.  Hepatitis B and C nonreactive.  aFP, iron panel, ceruloplasmin, lipid panel all normal.  - Recommend complete tobacco cessation  - Supportive care.    Electronically signed by Britney Sanders PA-C, 06/18/25, 11:42 AM EDT.

## 2025-06-18 NOTE — CASE MANAGEMENT/SOCIAL WORK
Continued Stay Note  EDSON Frazier     Patient Name: Gerri Salcido  MRN: [id number]  Today's Date: 6/18/2025    Admit Date: 6/16/2025    Plan: DC Plan: Home. Family can transport at discharge.   Discharge Plan       Row Name 06/18/25 1602       Plan    Plan DC Plan: Home. Family can transport at discharge.    Patient/Family in Agreement with Plan yes    Plan Comments DC barriers: pod#1 EGD with bx, surgery today  6.18.25 CHOLECYSTECTOMY LAPAROSCOPIC ATTEMPTED CHOLANGIOGRAM WITH DAVINCI ROBOTm monitoring labs. toleration of diet                    Jaycee Aguiar RN    SIPS 1  [email or web address]  Office [phone]  Cell [phone]

## 2025-06-18 NOTE — PLAN OF CARE
Goal Outcome Evaluation:  Plan of Care Reviewed With: patient        Progress: improving          Patient resting with eyes closed, family at bedside. Ok with MD to DC today or in am

## 2025-06-18 NOTE — ANESTHESIA PROCEDURE NOTES
Airway  Reason: elective    Date/Time: 6/18/2025 1:15 PM  Airway not difficult    General Information and Staff    Patient location during procedure: OR  CRNA/CAA: Raymond Han CRNA    Indications and Patient Condition  Indications for airway management: airway protection    Preoxygenated: yes    Mask difficulty assessment: 1 - vent by mask    Final Airway Details    Final airway type: endotracheal airway      Successful airway: ETT  Cuffed: yes   Successful intubation technique: direct laryngoscopy  Adjuncts used in placement: intubating stylet  Endotracheal tube insertion site: oral  Blade: Nir  Blade size: 3  ETT size (mm): 7.0  Cormack-Lehane Classification: grade I - full view of glottis  Placement verified by: capnometry   Measured from: lips  ETT/EBT  to lips (cm): 22  Number of attempts at approach: 1  Assessment: lips, teeth, and gum same as pre-op and atraumatic intubation             (2) Patient Placed in Bed

## 2025-06-18 NOTE — DISCHARGE INSTRUCTIONS
Post-Operative Instructions - robotic cholecystectomy :  no heavy lifting (>10-15 lbs) for at least 2 weeks   no driving while under the influence of narcotics   ok to shower, no bathing/swimming for 2 weeks   call with any questions or concerns

## 2025-06-18 NOTE — ANESTHESIA PREPROCEDURE EVALUATION
Anesthesia Evaluation     Patient summary reviewed and Nursing notes reviewed   NPO Solid Status: > 8 hours  NPO Liquid Status: > 2 hours           Airway   Mallampati: II  TM distance: >3 FB  Neck ROM: full  Dental          Pulmonary    (+) a smoker Current,decreased breath sounds  Cardiovascular - normal exam  Exercise tolerance: good (4-7 METS)    ECG reviewed    (+) hypertension well controlled, MOODY      Neuro/Psych  GI/Hepatic/Renal/Endo    (+) obesity, GERD well controlled    Musculoskeletal     (+) arthralgias  Abdominal   (+) obese    Abdomen: tender.   Substance History      OB/GYN          Other   arthritis,                 Anesthesia Plan    ASA 2     general and general with block     intravenous induction     Anesthetic plan, risks, benefits, and alternatives have been provided, discussed and informed consent has been obtained with: patient.  Pre-procedure education provided  Use of blood products discussed with patient  Consented to blood products.    Plan discussed with CRNA.    CODE STATUS:    Code Status (Patient has no pulse and is not breathing): CPR (Attempt to Resuscitate)  Medical Interventions (Patient has pulse or is breathing): Full Support

## 2025-06-18 NOTE — PROGRESS NOTES
LOS: 0 days   Patient Care Team:  Karin Briseno MD as PCP - General (Family Medicine)  Provider, No Known as PCP - Family Medicine    Subjective     Sleeping with family at bedside, anticipating surgery later today    Review of Systems   Constitutional:  Positive for activity change.   HENT: Negative.     Respiratory: Negative.     Cardiovascular: Negative.    Gastrointestinal:  Positive for abdominal pain and nausea.   Genitourinary: Negative.    Musculoskeletal: Negative.    Skin: Negative.    Neurological: Negative.    Psychiatric/Behavioral: Negative.             Objective     Vital Signs  Temp:  [97 °F (36.1 °C)-97.7 °F (36.5 °C)] 97.5 °F (36.4 °C)  Heart Rate:  [52-71] 71  Resp:  [15-23] 16  BP: ()/(52-78) 130/78      Physical Exam  Vitals reviewed.   HENT:      Right Ear: External ear normal.      Left Ear: External ear normal.      Nose: Nose normal.   Eyes:      General:         Right eye: No discharge.         Left eye: No discharge.   Cardiovascular:      Rate and Rhythm: Normal rate.   Pulmonary:      Effort: Pulmonary effort is normal.   Abdominal:      Tenderness: There is abdominal tenderness.   Musculoskeletal:         General: Normal range of motion.   Skin:     General: Skin is dry.   Neurological:      Mental Status: She is alert and oriented to person, place, and time.   Psychiatric:         Mood and Affect: Mood normal.         Behavior: Behavior normal.              Results Review:    Lab Results (last 24 hours)       Procedure Component Value Units Date/Time    Hepatitis A Antibody, Total [[id number]]  (Abnormal) Collected: 06/17/25 1201    Specimen: Blood Updated: 06/18/25 0911     Hep A Total Ab Positive     Comment: Comment: The HAV total antibody assay detects both IgG and IgM  but does not differentiate between them. A negative result  suggests susceptibility to infection. A positive result could  be due to vaccination, previously resolved infection or active  infection.  Testing for HAV IgM should be performed if active  HAV infection is suspected. [facility] offers profiles that will  automatically reflex positive HAV total antibody results to  IgM (e.g., panel #[id number] HAV Antibody w/ Rfx).       Narrative:      Performed at:  [address]  [id number]  : Jason Cerna PhD, Phone:  [phone]    Tissue Pathology Exam [[id number]] Collected: 06/17/25 1533    Specimen: Tissue from Gastric Updated: 06/18/25 0719    Comprehensive Metabolic Panel [[id number]]  (Abnormal) Collected: 06/17/25 2338    Specimen: Blood Updated: 06/18/25 0036     Glucose 74 mg/dL      BUN 7.6 mg/dL      Creatinine 0.50 mg/dL      Sodium 141 mmol/L      Potassium 3.8 mmol/L      Chloride 108 mmol/L      CO2 22.5 mmol/L      Calcium 8.7 mg/dL      Total Protein 5.7 g/dL      Albumin 3.7 g/dL      ALT (SGPT) 79 U/L      AST (SGOT) 20 U/L      Alkaline Phosphatase 102 U/L      Total Bilirubin 0.3 mg/dL      Globulin 2.0 gm/dL      A/G Ratio 1.9 g/dL      BUN/Creatinine Ratio 15.2     Anion Gap 10.5 mmol/L      eGFR 113.0 mL/min/1.73     Narrative:      GFR Categories in Chronic Kidney Disease (CKD)              GFR Category          GFR (mL/min/1.73)    Interpretation  G1                    90 or greater        Normal or high (1)  G2                    60-89                Mild decrease (1)  G3a                   45-59                Mild to moderate decrease  G3b                   30-44                Moderate to severe decrease  G4                    15-29                Severe decrease  G5                    14 or less           Kidney failure    (1)In the absence of evidence of kidney disease, neither GFR category G1 or G2 fulfill the criteria for CKD.    eGFR calculation 2021 CKD-EPI creatinine equation, which does not include race as a factor    Lipase [[id number]]  (Abnormal) Collected: 06/17/25 2338    Specimen: Blood Updated: 06/18/25 0036     Lipase 78 U/L      C-reactive Protein [[id number]]  (Abnormal) Collected: 06/17/25 2338    Specimen: Blood Updated: 06/18/25 0036     C-Reactive Protein 1.02 mg/dL     aPTT [[id number]]  (Normal) Collected: 06/17/25 2338    Specimen: Blood Updated: 06/18/25 0023     PTT 33.1 seconds     Protime-INR [[id number]]  (Abnormal) Collected: 06/17/25 2338    Specimen: Blood Updated: 06/18/25 0023     Protime 14.7 Seconds      INR 1.15    Extra Tubes [[id number]] Collected: 06/17/25 2338    Specimen: Blood, Venous Line Updated: 06/18/25 0015    Narrative:      The following orders were created for panel order Extra Tubes.  Procedure                               Abnormality         Status                     ---------                               -----------         ------                     Red Top[[id number]]                                          Final result               Gold Top - SST[[id number]]                                   Final result               Gold Top - SST[[id number]]                                   Final result                 Please view results for these tests on the individual orders.    Red Top [[id number]] Collected: 06/17/25 2338    Specimen: Blood Updated: 06/18/25 0015     Extra Tube Hold for add-ons.     Comment: Auto resulted.       Gold Top - SST [[id number]] Collected: 06/17/25 2338    Specimen: Blood Updated: 06/18/25 0015     Extra Tube Hold for add-ons.     Comment: Auto resulted.       Gold Top - SST [[id number]] Collected: 06/17/25 2338    Specimen: Blood Updated: 06/18/25 0015     Extra Tube Hold for add-ons.     Comment: Auto resulted.       CBC & Differential [[id number]]  (Normal) Collected: 06/17/25 2338    Specimen: Blood Updated: 06/18/25 0013    Narrative:      The following orders were created for panel order CBC & Differential.  Procedure                               Abnormality         Status                     ---------                               -----------         ------                      CBC Auto Differential[[id number]]        Normal              Final result                 Please view results for these tests on the individual orders.    CBC Auto Differential [[id number]]  (Normal) Collected: 06/17/25 2338    Specimen: Blood Updated: 06/18/25 0013     WBC 6.52 10*3/mm3      RBC 4.00 10*6/mm3      Hemoglobin 12.4 g/dL      Hematocrit 36.9 %      MCV 92.3 fL      MCH 31.0 pg      MCHC 33.6 g/dL      RDW 12.4 %      RDW-SD 42.5 fl      MPV 9.7 fL      Platelets 273 10*3/mm3      Neutrophil % 44.5 %      Lymphocyte % 42.8 %      Monocyte % 7.5 %      Eosinophil % 4.4 %      Basophil % 0.5 %      Immature Grans % 0.3 %      Neutrophils, Absolute 2.90 10*3/mm3      Lymphocytes, Absolute 2.79 10*3/mm3      Monocytes, Absolute 0.49 10*3/mm3      Eosinophils, Absolute 0.29 10*3/mm3      Basophils, Absolute 0.03 10*3/mm3      Immature Grans, Absolute 0.02 10*3/mm3      nRBC 0.0 /100 WBC     Ceruloplasmin [[id number]] Collected: 06/17/25 2338    Specimen: Blood Updated: 06/18/25 0007    IgG, IgA, IgM [[id number]] Collected: 06/17/25 2338    Specimen: Blood Updated: 06/18/25 0007    A1A, Quant & Genotype (Rfx Pheno) [[id number]] Collected: 06/17/25 2338    Specimen: Blood Updated: 06/18/25 0007    Anti-Smooth Muscle Antibody Titer [[id number]] Collected: 06/17/25 2338    Specimen: Blood Updated: 06/18/25 0006    Mitochondrial Antibodies, M2 [[id number]] Collected: 06/17/25 2338    Specimen: Blood Updated: 06/18/25 0006    AFP Tumor Marker [[id number]]  (Normal) Collected: 06/16/25 1555    Specimen: Blood from Arm, Left Updated: 06/17/25 1609     ALPHA-FETOPROTEIN <2 ng/mL     Narrative:      Alpha Fetoprotein Tumor Marker Reference Range:    0.0-8.3 ng/mL    Note: Normal values apply only to males and nonpregnant females. These results are not interpretable for pregnant females.    Testing Method: [employer] Electrochemiluminescence Immunoassay(ECLIA)  Values obtained with different assay methods or kits  cannot be used interchangeably.    Gamma GT [[id number]]  (Abnormal) Collected: 06/17/25 0259    Specimen: Blood from Arm, Left Updated: 06/17/25 1556      U/L     Hepatitis Panel, Acute [[id number]]  (Normal) Collected: 06/17/25 1201    Specimen: Blood Updated: 06/17/25 1236     Hepatitis B Surface Ag Non-Reactive     Hep A IgM Non-Reactive     Hep B C IgM Non-Reactive     Hepatitis C Ab Non-Reactive    Narrative:      Results may be falsely decreased if patient taking Biotin.     Iron Profile w/o Ferritin [[id number]]  (Normal) Collected: 06/17/25 0259    Specimen: Blood from Arm, Left Updated: 06/17/25 1206     Iron 107 mcg/dL      Iron Saturation (TSAT) 33 %      Transferrin 220 mg/dL      TIBC 328 mcg/dL     Ferritin [[id number]]  (Normal) Collected: 06/17/25 0259    Specimen: Blood from Arm, Left Updated: 06/17/25 1206     Ferritin 68.00 ng/mL     Narrative:      Results may be falsely decreased if patient taking Biotin.      SENTHIL [[id number]] Collected: 06/17/25 1200    Specimen: Blood Updated: 06/17/25 1200             Imaging Results (Last 24 Hours)       Procedure Component Value Units Date/Time    XR Chest 1 View [[id number]] Collected: 06/18/25 0713     Updated: 06/18/25 0716    Narrative:      XR CHEST 1 VW    Date of Exam: 6/18/2025 5:45 AM EDT    Indication: Pre-op    Comparison: 7/18/2020    Findings:  The cardiomediastinal silhouette is within normal limits. Lungs are clear. No focal consolidation, pneumothorax, or significant pleural effusion. Osseous structures grossly intact.      Impression:      Impression:  No acute process.          Electronically Signed: Sonu Tenorio MD    6/18/2025 7:14 AM EDT    Workstation ID: [id number]                 I reviewed the patient's new clinical results.    Medication Review:   Scheduled Meds:cefTRIAXone, 1,000 mg, Intravenous, Q24H  DULoxetine, 40 mg, Oral, Daily  metroNIDAZOLE, 500 mg, Intravenous, Q8H  nicotine, 1 patch, Transdermal, Q24H  pantoprazole, 40  mg, Intravenous, Q AM  polyethylene glycol, 17 g, Oral, Daily  sodium chloride, 10 mL, Intravenous, Q12H      Continuous Infusions:lactated ringers, 125 mL/hr, Last Rate: 125 mL/hr (06/18/25 0846)      PRN Meds:.  senna-docusate sodium **AND** polyethylene glycol **AND** bisacodyl **AND** bisacodyl    Calcium Replacement - Follow Nurse / BPA Driven Protocol    cyclobenzaprine    hydrALAZINE    HYDROmorphone    Magnesium Standard Dose Replacement - Follow Nurse / BPA Driven Protocol    ondansetron    Phosphorus Replacement - Follow Nurse / BPA Driven Protocol    Potassium Replacement - Follow Nurse / BPA Driven Protocol    [COMPLETED] Insert Peripheral IV **AND** sodium chloride    sodium chloride    sodium chloride     Interval History:    Assessment & Plan       Acute cholecystitis    Abdominal pain    Essential hypertension    Cigarette smoker    GERD without esophagitis    Obesity (BMI 30-39.9)    Gallstone pancreatitis    Epigastric abdominal pain       Cholecystitis             -General Surgery planning lap jayme later today              -Pain  and nausea control    Epigastric abdominal pain, ?  Acute pancreatitis   - Status post EGD 6/17/2025 shows gastritis, medium sliding hiatal hernia   - Continue PPI   - Trend lipase and CRP     HTN- prn hydralazine while NPO     Nicotine patch for smoking. Encouraged smoking cessation     Right knee pain- pt to follow up with ortho as outpatient.   Hx of partial knee replacement.     GI: Protonix  VTE: SCD     CODE STATUS:  Code status (Patient has no pulse and is not breathing):  CPR (Attempt to Resuscitate)  Medical Interventions (Patient has pulse or is breathing):  Full Support  Level of Support Discussed with:  Patient     Admission Status:  I believe this patient meets observation status     Expected length of stay:  2 midnights or less     I discussed the patient's findings and my recommendations with patient.        Grisel Hill, APRN  06/18/25  09:30 EDT

## 2025-06-18 NOTE — PLAN OF CARE
Problem: Adult Inpatient Plan of Care  Goal: Plan of Care Review  Outcome: Progressing  Goal: Patient-Specific Goal (Individualized)  Outcome: Progressing  Goal: Absence of Hospital-Acquired Illness or Injury  Outcome: Progressing  Goal: Optimal Comfort and Wellbeing  Outcome: Progressing  Intervention: Provide Person-Centered Care  Recent Flowsheet Documentation  Taken 6/17/2025 2000 by Gerri Kebede RN  Trust Relationship/Rapport:   care explained   questions answered   thoughts/feelings acknowledged  Goal: Readiness for Transition of Care  Outcome: Progressing     Problem: Pain Acute  Goal: Optimal Pain Control and Function  Outcome: Progressing  Intervention: Optimize Psychosocial Wellbeing  Recent Flowsheet Documentation  Taken 6/17/2025 2000 by Gerri Kebede RN  Supportive Measures: active listening utilized  Diversional Activities:   smartphone   television     Problem: Fall Injury Risk  Goal: Absence of Fall and Fall-Related Injury  Outcome: Progressing   Goal Outcome Evaluation:

## 2025-06-18 NOTE — OP NOTE
General Surgery Operative Report  Date: 06/18/25   Patient: Gerri Salcido  Surgeon: Abel Kaplan MD   Assistant: FABIENNE Beverly; This surgery was assisted and facilitated by a certified surgical first assist, who directly resulted in decreased operative time, anesthetic time, exposure of surgical field and possibly of an operative wound infection thereby decreasing patient morbidity and ultimately total expenditures.   Pre-operative Diagnosis: Gallstone pancreatitis  Post-operative Diagnosis: Same  Procedure: Robotic cholecystectomy with attempted cholangiogram  Findings:   Dilated, inflamed gallbladder consistent with gallstone pancreatitis versus chronic cholecystitis  Dilated cystic duct with initial leakage of bile following ductotomy, but cholangiogram was unable to be performed due to persistent leakage of saline ultimately leading to inability to perform cholangiogram  Wound Class: III  Estimated blood loss: Minimal  Specimen: Gallbladder  Implants: 3-0 silk suture (cystic duct remnant)  Implant Name Type Inv. Item Serial No.  Lot No. LRB No. Used Action   CLIP LIG HEMOLOK PA LG 6CT PRP - [id number] Implant CLIP LIG HEMOLOK PA LG 6CT PRP  Intradiem [id number] N/A 1 Implanted   Complications: None    Indications:  Gerri Salcido is a 54-year-old female who was recently mated with upper abdominal pain.  Lipase was elevated and right quadrant ultrasound demonstrated evidence of cholelithiasis for which patient was offered a robotic cholecystectomy with intraoperative cholangiogram for diagnosis of gallstone pancreatitis.    Consent:   The risks (postoperative pain, nausea/vomiting, bleeding, hematoma/seroma, incisional hernia, injury to any underlying viscera, bile leak, biliary stricture, need for ERCP, need for conversion to open surgery, and complications associated with general anesthesia including stroke, heart attack, and death), benefits, and alternative therapies of  robotic cholecystectomy with intraoperative cholangiogram were discussed in great detail with the patient and her . The patient voiced understanding and wishes to proceed with surgery.    Operative details:   The patient was brought to the operating room and placed in supine position. General anesthesia was induced and the patient was successfully intubated by Anesthesiology.  The abdomen was prepped and draped in the usual sterile fashion, after which a brief time-out was held in which the patient, the procedure, preoperative antibiotics, and fire risk were identified and agreed upon by all available members of the operating room staff.    Using a #15 blade, an 8 mm transverse incision was made over the left periumbilical area.  Utilizing Optiview technique, peritoneal cavity was accessed new peritoneum was established pressure of 15 mmHg.  The abdomen was explored for evidence of entry trauma; none was identified.  The patient was placed in reverse Trendelenburg position with the table tilted towards patient's left side.  Under direct visualization, 8 mm robotic ports were placed in the right mid abdominal area along the midclavicular and anterior axillary lines.  A 12 mm robotic port was then placed at Negrete's point, after which the Optiview port was exchanged for an additional 8 mm robotic port.  The robot was then brought to the patient's left side and appropriately docked.    The gallbladder is identified at the inferior edge of the right lobe the liver.  The gallbladder was dilated and chronically inflamed consistent with either chronic cholecystitis versus gallstone pancreatitis.  The fundus of the gallbladder was reflected cephalad over the right lobe of the liver, after which the infundibular gallbladder was tracked towards the patient's right side until the triangle of Calot was exposed.  Electrocautery was then used to divide the peritoneal reflections of the gallbladder, after which a mixture of  blunt dissection and electrocautery were used to circumferentially dissect the cystic duct and artery until a critical view of safety was obtained.  This portion of the case performed using ICG fluorescent imaging.    Given the patient's preoperative diagnosis of gallstone pancreatitis, decision was made to proceed with an intraoperative cholangiogram.  The cystic duct was ligated near the neck of the gallbladder using a Hem-o-nidia clip.  Electrocautery was used to fashion a ductotomy distal to the clip, after which a Garcia cholangiogram catheter was placed in the abdomen through a 16-gauge Angiocath.  Several attempts were made to place the catheter within the cystic duct.  Despite initial leakage of fluorescent bile, the catheter could not be advanced more than a few centimeters into the duct.  The catheter was flushed numerous times, but the catheter cannot be secured to the duct without significant leakage.  The catheter was then removed, but no bile could be seen emanating from the cystic duct.  Given that the patient's preoperative LFTs were otherwise normal, decision was made to abandon further attempts at cholangiogram.    Cholangiogram catheter was removed.  Cystic duct was then ligated with Hem-o-nidia clips distal to the dichotomy.  The cystic artery was then ligated in similar fashion.  Both cystic duct and artery were then divided with electrocautery.  The cystic duct closure was further reinforced using a pursestring 3-0 silk suture distal to the clips.  Electrocautery was then used to divide the gallbladder and the cystic plate, after which the gallbladder was placed in an Endo Catch bag and passed off the field as a specimen.    The cystic plate was reexplored and found to be hemostatic with no evidence of bile leak.  The fascial defect at Negrete's point was closed laparoscopically using a #0 Vicryl suture.  At this point, the robot was undocked.  All instruments removed and the abdomen pneumoperitoneum  was released.  Each incision was closed using buried interrupted 4-0 Monocryl sutures.  Exofin skin glue was then applied to all incisions.    All instruments, sponge, and needle counts were correct at the end of the case.  The patient tolerated the procedure without any immediate complications.  Following completion of the case, the patient underwent a TAP block, which was performed by Anesthesiology. The patient was extubated in the operating room and was transported to the PACU in stable condition.     Disposition:   Return to floor  Discontinue antibiotics  Okay for diet  Okay for discharge home later today versus tomorrow morning    Abel Kaplan MD   General Surgery  06/18/25   14:53 EDT     Much of this encounter note is an electronic transcription/translation of spoken language to printed text.  The electronic translation of spoken language may permit erroneous, or at times, nonsensical words or phrases to be inadvertently transcribed.  Although I have reviewed the note for such errors, some may still exist.

## 2025-06-19 VITALS
OXYGEN SATURATION: 100 % | DIASTOLIC BLOOD PRESSURE: 84 MMHG | SYSTOLIC BLOOD PRESSURE: 136 MMHG | RESPIRATION RATE: 18 BRPM | HEART RATE: 59 BPM | HEIGHT: 64 IN | WEIGHT: 195.11 LBS | BODY MASS INDEX: 33.31 KG/M2 | TEMPERATURE: 97.9 F

## 2025-06-19 LAB
ALBUMIN SERPL-MCNC: 4 G/DL (ref 3.5–5.2)
ALBUMIN/GLOB SERPL: 2.2 G/DL
ALP SERPL-CCNC: 96 U/L (ref 39–117)
ALT SERPL W P-5'-P-CCNC: 59 U/L (ref 1–33)
ANION GAP SERPL CALCULATED.3IONS-SCNC: 9 MMOL/L (ref 5–15)
AST SERPL-CCNC: 21 U/L (ref 1–32)
BASOPHILS # BLD AUTO: 0.01 10*3/MM3 (ref 0–0.2)
BASOPHILS NFR BLD AUTO: 0.1 % (ref 0–1.5)
BILIRUB SERPL-MCNC: 0.3 MG/DL (ref 0–1.2)
BUN SERPL-MCNC: 6.8 MG/DL (ref 6–20)
BUN/CREAT SERPL: 11.1 (ref 7–25)
CALCIUM SPEC-SCNC: 8.7 MG/DL (ref 8.6–10.5)
CHLORIDE SERPL-SCNC: 106 MMOL/L (ref 98–107)
CO2 SERPL-SCNC: 22 MMOL/L (ref 22–29)
CREAT SERPL-MCNC: 0.61 MG/DL (ref 0.57–1)
DEPRECATED RDW RBC AUTO: 43.3 FL (ref 37–54)
EGFRCR SERPLBLD CKD-EPI 2021: 107.7 ML/MIN/1.73
EOSINOPHIL # BLD AUTO: 0 10*3/MM3 (ref 0–0.4)
EOSINOPHIL NFR BLD AUTO: 0 % (ref 0.3–6.2)
ERYTHROCYTE [DISTWIDTH] IN BLOOD BY AUTOMATED COUNT: 12.4 % (ref 12.3–15.4)
GLOBULIN UR ELPH-MCNC: 1.8 GM/DL
GLUCOSE SERPL-MCNC: 244 MG/DL (ref 65–99)
HCT VFR BLD AUTO: 35.8 % (ref 34–46.6)
HGB BLD-MCNC: 11.7 G/DL (ref 12–15.9)
IMM GRANULOCYTES # BLD AUTO: 0.07 10*3/MM3 (ref 0–0.05)
IMM GRANULOCYTES NFR BLD AUTO: 0.8 % (ref 0–0.5)
LAB AP CASE REPORT: NORMAL
LAB AP CLINICAL INFORMATION: NORMAL
LIPASE SERPL-CCNC: 40 U/L (ref 13–60)
LYMPHOCYTES # BLD AUTO: 0.74 10*3/MM3 (ref 0.7–3.1)
LYMPHOCYTES NFR BLD AUTO: 8.7 % (ref 19.6–45.3)
MCH RBC QN AUTO: 31.1 PG (ref 26.6–33)
MCHC RBC AUTO-ENTMCNC: 32.7 G/DL (ref 31.5–35.7)
MCV RBC AUTO: 95.2 FL (ref 79–97)
MITOCHONDRIA M2 IGG SER-ACNC: <20 UNITS (ref 0–20)
MONOCYTES # BLD AUTO: 0.21 10*3/MM3 (ref 0.1–0.9)
MONOCYTES NFR BLD AUTO: 2.5 % (ref 5–12)
NEUTROPHILS NFR BLD AUTO: 7.51 10*3/MM3 (ref 1.7–7)
NEUTROPHILS NFR BLD AUTO: 87.9 % (ref 42.7–76)
NRBC BLD AUTO-RTO: 0 /100 WBC (ref 0–0.2)
PATH REPORT.FINAL DX SPEC: NORMAL
PATH REPORT.GROSS SPEC: NORMAL
PLATELET # BLD AUTO: 264 10*3/MM3 (ref 140–450)
PMV BLD AUTO: 9.9 FL (ref 6–12)
POTASSIUM SERPL-SCNC: 4 MMOL/L (ref 3.5–5.2)
PROT SERPL-MCNC: 5.8 G/DL (ref 6–8.5)
RBC # BLD AUTO: 3.76 10*6/MM3 (ref 3.77–5.28)
SMA IGG SER-ACNC: 3 UNITS (ref 0–19)
SODIUM SERPL-SCNC: 137 MMOL/L (ref 136–145)
WBC NRBC COR # BLD AUTO: 8.54 10*3/MM3 (ref 3.4–10.8)

## 2025-06-19 PROCEDURE — 99024 POSTOP FOLLOW-UP VISIT: CPT

## 2025-06-19 PROCEDURE — 83690 ASSAY OF LIPASE: CPT

## 2025-06-19 PROCEDURE — 80053 COMPREHEN METABOLIC PANEL: CPT | Performed by: STUDENT IN AN ORGANIZED HEALTH CARE EDUCATION/TRAINING PROGRAM

## 2025-06-19 PROCEDURE — 85025 COMPLETE CBC W/AUTO DIFF WBC: CPT | Performed by: STUDENT IN AN ORGANIZED HEALTH CARE EDUCATION/TRAINING PROGRAM

## 2025-06-19 RX ORDER — SORBITOL SOLUTION 70 %
30 SOLUTION, ORAL MISCELLANEOUS ONCE
Status: COMPLETED | OUTPATIENT
Start: 2025-06-19 | End: 2025-06-19

## 2025-06-19 RX ORDER — PANTOPRAZOLE SODIUM 40 MG/1
40 TABLET, DELAYED RELEASE ORAL
Status: DISCONTINUED | OUTPATIENT
Start: 2025-06-19 | End: 2025-06-19 | Stop reason: HOSPADM

## 2025-06-19 RX ORDER — PSEUDOEPHEDRINE HCL 30 MG
100 TABLET ORAL 2 TIMES DAILY
Start: 2025-06-19

## 2025-06-19 RX ORDER — PANTOPRAZOLE SODIUM 40 MG/1
40 TABLET, DELAYED RELEASE ORAL
Qty: 30 TABLET | Refills: 0 | Status: SHIPPED | OUTPATIENT
Start: 2025-06-20

## 2025-06-19 RX ORDER — POLYETHYLENE GLYCOL 3350 17 G/17G
17 POWDER, FOR SOLUTION ORAL DAILY
Start: 2025-06-20

## 2025-06-19 RX ORDER — DOCUSATE SODIUM 100 MG/1
100 CAPSULE, LIQUID FILLED ORAL 2 TIMES DAILY
Status: DISCONTINUED | OUTPATIENT
Start: 2025-06-19 | End: 2025-06-19 | Stop reason: HOSPADM

## 2025-06-19 RX ADMIN — POLYETHYLENE GLYCOL 3350 17 G: 17 POWDER, FOR SOLUTION ORAL at 08:09

## 2025-06-19 RX ADMIN — NICOTINE 1 PATCH: 21 PATCH TRANSDERMAL at 08:09

## 2025-06-19 RX ADMIN — CYCLOBENZAPRINE HYDROCHLORIDE 10 MG: 10 TABLET, FILM COATED ORAL at 08:09

## 2025-06-19 RX ADMIN — SORBITOL SOLUTION (BULK) 30 ML: 70 SOLUTION at 09:52

## 2025-06-19 RX ADMIN — DULOXETINE 40 MG: 20 CAPSULE, DELAYED RELEASE ORAL at 08:09

## 2025-06-19 RX ADMIN — PANTOPRAZOLE SODIUM 40 MG: 40 TABLET, DELAYED RELEASE ORAL at 05:09

## 2025-06-19 RX ADMIN — DOCUSATE SODIUM 100 MG: 100 CAPSULE, LIQUID FILLED ORAL at 09:52

## 2025-06-19 NOTE — DISCHARGE SUMMARY
Date of Discharge:  6/19/2025    Discharge Diagnosis:   Acute cholecystitis  Abdominal pain  Essential hypertension  Cigarette smoker  GERD without esophagitis  Obesity (BMI 30-39.9)  Gallstone pancreatitis  Epigastric abdominal pain       Presenting Problem/History of Present Illness  Active Hospital Problems    Diagnosis  POA    **Acute cholecystitis [K81.0]  Yes    Essential hypertension [I10]  Yes    Cigarette smoker [F17.210]  Yes    GERD without esophagitis [K21.9]  Yes    Obesity (BMI 30-39.9) [E66.9]  Yes    Abdominal pain [R10.9]  Yes    Gallstone pancreatitis [K85.10]  Yes    Epigastric abdominal pain [R10.13]  Yes    Pancreatitis [K85.90]  Yes      Resolved Hospital Problems   No resolved problems to display.          Hospital Course    Patient is a 54 y.o. female presented with pmh of HTN who presents with abdominal pain since Saturday which is described as sharp in epigastric and radiating to her right shoulder. Pain has occurred intermittently over the past couple of years. She was found to have gallstone pancreatitis. Patient did have a EGD on 6/17 with mild gastritis, medium sized sliding hiatal hernia, with recommendation continue PPI daily.  Patient had on 6/18 cholecystectomy laparoscopic.  Has done well since surgery tolerating diet.    Procedures Performed    Procedure(s):  ESOPHAGOGASTRODUODENOSCOPY with gastric biopsy  -------------------    Procedure(s):  CHOLECYSTECTOMY LAPAROSCOPIC ATTEMPTED CHOLANGIOGRAM WITH DAVINCI ROBOT  -------------------  06/17 1526 Upper GI Endoscopy    Consults:   Consults       Date and Time Order Name Status Description    6/17/2025  4:32 AM Inpatient General Surgery Consult Completed     6/16/2025  6:40 PM Gastroenterology (on-call MD unless specified) Completed     6/16/2025  6:33 PM Family Medicine Consult              Pertinent Test Results:    Lab Results (most recent)       Procedure Component Value Units Date/Time    Tissue Pathology Exam [[id number]]  Collected: 06/17/25 1533    Specimen: Tissue from Gastric Updated: 06/19/25 0849     Case Report --     Surgical Pathology Report                         Case: [id number]                                  Authorizing Provider:  INDU Monroy MD       Collected:           06/17/2025 03:33 PM          Ordering Location:     [facility]  Received:            06/18/2025 07:19 AM                                 SUITES                                                                       Pathologist:           Eugenio Nevarez MD                                                             Specimen:    Gastric, Antrum body biopsy                                                                 Clinical Information --     R/O H Pylori       Final Diagnosis --     Stomach, antrum and body, biopsies:    Reactive gastropathy with lamina propria congestion    No evidence of increased inflammation or H. pylori organisms    JPR       Gross Description --     1. Gastric.  Received in formalin designated antrum and body are a few fragments of tan tissue measuring up to 0.5 cm in greatest dimension.  Submitted in 1 cassette.  BERTIN        Lipase [[id number]]  (Normal) Collected: 06/19/25 0055    Specimen: Blood Updated: 06/19/25 0758     Lipase 40 U/L     Tissue Pathology Exam [[id number]] Collected: 06/18/25 1339    Specimen: Tissue from Gallbladder Updated: 06/19/25 0727    Comprehensive Metabolic Panel [[id number]]  (Abnormal) Collected: 06/19/25 0055    Specimen: Blood Updated: 06/19/25 0120     Glucose 244 mg/dL      BUN 6.8 mg/dL      Creatinine 0.61 mg/dL      Sodium 137 mmol/L      Potassium 4.0 mmol/L      Chloride 106 mmol/L      CO2 22.0 mmol/L      Calcium 8.7 mg/dL      Total Protein 5.8 g/dL      Albumin 4.0 g/dL      ALT (SGPT) 59 U/L      AST (SGOT) 21 U/L      Alkaline Phosphatase 96 U/L      Total Bilirubin 0.3 mg/dL      Globulin 1.8 gm/dL      A/G Ratio 2.2 g/dL      BUN/Creatinine Ratio 11.1     Anion  Gap 9.0 mmol/L      eGFR 107.7 mL/min/1.73     Narrative:      GFR Categories in Chronic Kidney Disease (CKD)              GFR Category          GFR (mL/min/1.73)    Interpretation  G1                    90 or greater        Normal or high (1)  G2                    60-89                Mild decrease (1)  G3a                   45-59                Mild to moderate decrease  G3b                   30-44                Moderate to severe decrease  G4                    15-29                Severe decrease  G5                    14 or less           Kidney failure    (1)In the absence of evidence of kidney disease, neither GFR category G1 or G2 fulfill the criteria for CKD.    eGFR calculation 2021 CKD-EPI creatinine equation, which does not include race as a factor    CBC & Differential [[id number]]  (Abnormal) Collected: 06/19/25 0055    Specimen: Blood Updated: 06/19/25 0100    Narrative:      The following orders were created for panel order CBC & Differential.  Procedure                               Abnormality         Status                     ---------                               -----------         ------                     CBC Auto Differential[[id number]]        Abnormal            Final result                 Please view results for these tests on the individual orders.    CBC Auto Differential [[id number]]  (Abnormal) Collected: 06/19/25 0055    Specimen: Blood Updated: 06/19/25 0100     WBC 8.54 10*3/mm3      RBC 3.76 10*6/mm3      Hemoglobin 11.7 g/dL      Hematocrit 35.8 %      MCV 95.2 fL      MCH 31.1 pg      MCHC 32.7 g/dL      RDW 12.4 %      RDW-SD 43.3 fl      MPV 9.9 fL      Platelets 264 10*3/mm3      Neutrophil % 87.9 %      Lymphocyte % 8.7 %      Monocyte % 2.5 %      Eosinophil % 0.0 %      Basophil % 0.1 %      Immature Grans % 0.8 %      Neutrophils, Absolute 7.51 10*3/mm3      Lymphocytes, Absolute 0.74 10*3/mm3      Monocytes, Absolute 0.21 10*3/mm3      Eosinophils, Absolute 0.00  10*3/mm3      Basophils, Absolute 0.01 10*3/mm3      Immature Grans, Absolute 0.07 10*3/mm3      nRBC 0.0 /100 WBC     IgG, IgA, IgM [[id number]]  (Normal) Collected: 06/17/25 2338    Specimen: Blood Updated: 06/18/25 1431     IgG 701 mg/dL      IgM 81 mg/dL      IgA 199 mg/dL     SENTHIL [[id number]]  (Normal) Collected: 06/17/25 1200    Specimen: Blood Updated: 06/18/25 1323     Antinuclear Antibodies (SENTHIL) Negative    Ceruloplasmin [[id number]]  (Normal) Collected: 06/17/25 2338    Specimen: Blood Updated: 06/18/25 1100     Ceruloplasmin 21 mg/dL     Hepatitis A Antibody, Total [[id number]]  (Abnormal) Collected: 06/17/25 1201    Specimen: Blood Updated: 06/18/25 0911     Hep A Total Ab Positive     Comment: Comment: The HAV total antibody assay detects both IgG and IgM  but does not differentiate between them. A negative result  suggests susceptibility to infection. A positive result could  be due to vaccination, previously resolved infection or active  infection. Testing for HAV IgM should be performed if active  HAV infection is suspected. [facility] offers profiles that will  automatically reflex positive HAV total antibody results to  IgM (e.g., panel #[id number] HAV Antibody w/ Rfx).       Narrative:      Performed at:  [address] [place] [address]  [id number]  : Jason Cerna PhD, Phone:  [phone]    Comprehensive Metabolic Panel [[id number]]  (Abnormal) Collected: 06/17/25 2338    Specimen: Blood Updated: 06/18/25 0036     Glucose 74 mg/dL      BUN 7.6 mg/dL      Creatinine 0.50 mg/dL      Sodium 141 mmol/L      Potassium 3.8 mmol/L      Chloride 108 mmol/L      CO2 22.5 mmol/L      Calcium 8.7 mg/dL      Total Protein 5.7 g/dL      Albumin 3.7 g/dL      ALT (SGPT) 79 U/L      AST (SGOT) 20 U/L      Alkaline Phosphatase 102 U/L      Total Bilirubin 0.3 mg/dL      Globulin 2.0 gm/dL      A/G Ratio 1.9 g/dL      BUN/Creatinine Ratio 15.2     Anion Gap 10.5 mmol/L      eGFR 113.0  mL/min/1.73     Narrative:      GFR Categories in Chronic Kidney Disease (CKD)              GFR Category          GFR (mL/min/1.73)    Interpretation  G1                    90 or greater        Normal or high (1)  G2                    60-89                Mild decrease (1)  G3a                   45-59                Mild to moderate decrease  G3b                   30-44                Moderate to severe decrease  G4                    15-29                Severe decrease  G5                    14 or less           Kidney failure    (1)In the absence of evidence of kidney disease, neither GFR category G1 or G2 fulfill the criteria for CKD.    eGFR calculation 2021 CKD-EPI creatinine equation, which does not include race as a factor    Lipase [[id number]]  (Abnormal) Collected: 06/17/25 2338    Specimen: Blood Updated: 06/18/25 0036     Lipase 78 U/L     C-reactive Protein [[id number]]  (Abnormal) Collected: 06/17/25 2338    Specimen: Blood Updated: 06/18/25 0036     C-Reactive Protein 1.02 mg/dL     aPTT [[id number]]  (Normal) Collected: 06/17/25 2338    Specimen: Blood Updated: 06/18/25 0023     PTT 33.1 seconds     Protime-INR [[id number]]  (Abnormal) Collected: 06/17/25 2338    Specimen: Blood Updated: 06/18/25 0023     Protime 14.7 Seconds      INR 1.15    Extra Tubes [[id number]] Collected: 06/17/25 2338    Specimen: Blood, Venous Line Updated: 06/18/25 0015    Narrative:      The following orders were created for panel order Extra Tubes.  Procedure                               Abnormality         Status                     ---------                               -----------         ------                     Red Top[[id number]]                                          Final result               Gold Top - SST[[id number]]                                   Final result               Gold Top - SST[[id number]]                                   Final result                 Please view results for these tests on the  individual orders.    Red Top [[id number]] Collected: 06/17/25 2338    Specimen: Blood Updated: 06/18/25 0015     Extra Tube Hold for add-ons.     Comment: Auto resulted.       Gold Top - SST [[id number]] Collected: 06/17/25 2338    Specimen: Blood Updated: 06/18/25 0015     Extra Tube Hold for add-ons.     Comment: Auto resulted.       [facility] Top - SST [[id number]] Collected: 06/17/25 2338    Specimen: Blood Updated: 06/18/25 0015     Extra Tube Hold for add-ons.     Comment: Auto resulted.       CBC & Differential [[id number]]  (Normal) Collected: 06/17/25 2338    Specimen: Blood Updated: 06/18/25 0013    Narrative:      The following orders were created for panel order CBC & Differential.  Procedure                               Abnormality         Status                     ---------                               -----------         ------                     CBC Auto Differential[[id number]]        Normal              Final result                 Please view results for these tests on the individual orders.    CBC Auto Differential [[id number]]  (Normal) Collected: 06/17/25 2338    Specimen: Blood Updated: 06/18/25 0013     WBC 6.52 10*3/mm3      RBC 4.00 10*6/mm3      Hemoglobin 12.4 g/dL      Hematocrit 36.9 %      MCV 92.3 fL      MCH 31.0 pg      MCHC 33.6 g/dL      RDW 12.4 %      RDW-SD 42.5 fl      MPV 9.7 fL      Platelets 273 10*3/mm3      Neutrophil % 44.5 %      Lymphocyte % 42.8 %      Monocyte % 7.5 %      Eosinophil % 4.4 %      Basophil % 0.5 %      Immature Grans % 0.3 %      Neutrophils, Absolute 2.90 10*3/mm3      Lymphocytes, Absolute 2.79 10*3/mm3      Monocytes, Absolute 0.49 10*3/mm3      Eosinophils, Absolute 0.29 10*3/mm3      Basophils, Absolute 0.03 10*3/mm3      Immature Grans, Absolute 0.02 10*3/mm3      nRBC 0.0 /100 WBC     A1A, Quant & Genotype (Rfx Pheno) [[id number]] Collected: 06/17/25 2338    Specimen: Blood Updated: 06/18/25 0007    Anti-Smooth Muscle Antibody Titer [[id number]]  Collected: 06/17/25 2338    Specimen: Blood Updated: 06/18/25 0006    Mitochondrial Antibodies, M2 [[id number]] Collected: 06/17/25 2338    Specimen: Blood Updated: 06/18/25 0006    AFP Tumor Marker [[id number]]  (Normal) Collected: 06/16/25 1555    Specimen: Blood from Arm, Left Updated: 06/17/25 1609     ALPHA-FETOPROTEIN <2 ng/mL     Narrative:      Alpha Fetoprotein Tumor Marker Reference Range:    0.0-8.3 ng/mL    Note: Normal values apply only to males and nonpregnant females. These results are not interpretable for pregnant females.    Testing Method: [employer] Electrochemiluminescence Immunoassay(ECLIA)  Values obtained with different assay methods or kits cannot be used interchangeably.    Gamma GT [[id number]]  (Abnormal) Collected: 06/17/25 0259    Specimen: Blood from Arm, Left Updated: 06/17/25 1556      U/L     Hepatitis Panel, Acute [[id number]]  (Normal) Collected: 06/17/25 1201    Specimen: Blood Updated: 06/17/25 1236     Hepatitis B Surface Ag Non-Reactive     Hep A IgM Non-Reactive     Hep B C IgM Non-Reactive     Hepatitis C Ab Non-Reactive    Narrative:      Results may be falsely decreased if patient taking Biotin.     Iron Profile w/o Ferritin [[id number]]  (Normal) Collected: 06/17/25 0259    Specimen: Blood from Arm, Left Updated: 06/17/25 1206     Iron 107 mcg/dL      Iron Saturation (TSAT) 33 %      Transferrin 220 mg/dL      TIBC 328 mcg/dL     Ferritin [[id number]]  (Normal) Collected: 06/17/25 0259    Specimen: Blood from Arm, Left Updated: 06/17/25 1206     Ferritin 68.00 ng/mL     Narrative:      Results may be falsely decreased if patient taking Biotin.      Basic Metabolic Panel [[id number]]  (Abnormal) Collected: 06/17/25 0259    Specimen: Blood from Arm, Left Updated: 06/17/25 0328     Glucose 87 mg/dL      BUN 10.0 mg/dL      Creatinine 0.56 mg/dL      Sodium 139 mmol/L      Potassium 3.4 mmol/L      Chloride 111 mmol/L      CO2 21.6 mmol/L      Calcium 8.7 mg/dL       BUN/Creatinine Ratio 17.9     Anion Gap 6.4 mmol/L      eGFR 110.0 mL/min/1.73     Narrative:      GFR Categories in Chronic Kidney Disease (CKD)              GFR Category          GFR (mL/min/1.73)    Interpretation  G1                    90 or greater        Normal or high (1)  G2                    60-89                Mild decrease (1)  G3a                   45-59                Mild to moderate decrease  G3b                   30-44                Moderate to severe decrease  G4                    15-29                Severe decrease  G5                    14 or less           Kidney failure    (1)In the absence of evidence of kidney disease, neither GFR category G1 or G2 fulfill the criteria for CKD.    eGFR calculation 2021 CKD-EPI creatinine equation, which does not include race as a factor    Lipid Panel [[id number]]  (Abnormal) Collected: 06/17/25 0259    Specimen: Blood from Arm, Left Updated: 06/17/25 0328     Total Cholesterol 172 mg/dL      Triglycerides 83 mg/dL      HDL Cholesterol 41 mg/dL      LDL Cholesterol  115 mg/dL      VLDL Cholesterol 16 mg/dL      LDL/HDL Ratio 2.79    Narrative:      Cholesterol Reference Ranges  (U.S. Department of Health and Human Services ATP III Classifications)    Desirable          <200 mg/dL  Borderline High    200-239 mg/dL  High Risk          >240 mg/dL      Triglyceride Reference Ranges  (U.S. Department of Health and Human Services ATP III Classifications)    Normal           <150 mg/dL  Borderline High  150-199 mg/dL  High             200-499 mg/dL  Very High        >500 mg/dL    HDL Reference Ranges  (U.S. Department of Health and Human Services ATP III Classifications)    Low     <40 mg/dl (major risk factor for CHD)  High    >60 mg/dl ('negative' risk factor for CHD)        LDL Reference Ranges  (U.S. Department of Health and Human Services ATP III Classifications)    Optimal          <100 mg/dL  Near Optimal     100-129 mg/dL  Borderline High  130-159  mg/dL  High             160-189 mg/dL  Very High        >189 mg/dL    LDL is calculated using the NIH LDL-C calculation.      Hemoglobin A1c [[id number]]  (Normal) Collected: 06/16/25 1555    Specimen: Blood from Arm, Left Updated: 06/17/25 0029     Hemoglobin A1C 5.54 %     Narrative:      Hemoglobin A1C Ranges:    Increased Risk for Diabetes  5.7% to 6.4%  Diabetes                     >= 6.5%  Diabetic Goal                < 7.0%    COVID-19, FLU A/B, RSV PCR 1 HR TAT - Swab, Nasopharynx [[id number]]  (Normal) Collected: 06/16/25 2000    Specimen: Swab from Nasopharynx Updated: 06/16/25 2047     COVID19 Not Detected     Influenza A PCR Not Detected     Influenza B PCR Not Detected     RSV, PCR Not Detected    Narrative:      Fact sheet for providers: https://www.fda.gov/media/819922/download    Fact sheet for patients: https://www.fda.gov/media/324456/download    Test performed by PCR.    Extra Tubes [[id number]] Collected: 06/16/25 1555    Specimen: Blood from Arm, Left Updated: 06/16/25 1631    Narrative:      The following orders were created for panel order Extra Tubes.  Procedure                               Abnormality         Status                     ---------                               -----------         ------                     Lavender Top[[id number]]                                     Final result               Gold Top - SST[[id number]]                                   Final result               Light Blue Top[[id number]]                                   Final result                 Please view results for these tests on the individual orders.    Lavender Top [[id number]] Collected: 06/16/25 1555    Specimen: Blood from Arm, Left Updated: 06/16/25 1631     Extra Tube hold for add-on     Comment: Auto resulted       Light Blue Top [[id number]] Collected: 06/16/25 1555    Specimen: Blood from Arm, Left Updated: 06/16/25 1631     Extra Tube Hold for add-ons.     Comment: Auto resulted       Scan Slide  [[id number]]  (Normal) Collected: 06/16/25 1555    Specimen: Blood from Arm, Left Updated: 06/16/25 1624     RBC Morphology Normal     WBC Morphology Normal     Platelet Morphology Normal    Narrative:      Slide Reviewed      Urinalysis With Culture If Indicated - Urine, Clean Catch [[id number]]  (Abnormal) Collected: 06/16/25 1548    Specimen: Urine, Clean Catch Updated: 06/16/25 1608     Color, UA Dark Yellow     Appearance, UA Cloudy     pH, UA 6.0     Specific Gravity, UA 1.024     Glucose, UA Negative     Ketones, UA Trace     Bilirubin, UA Negative     Blood, UA Small (1+)     Protein, UA Negative     Leuk Esterase, UA Trace     Nitrite, UA Negative     Urobilinogen, UA 1.0 E.U./dL    Narrative:      In absence of clinical symptoms, the presence of pyuria, bacteria, and/or nitrites on the urinalysis result does not correlate with infection.    Urinalysis, Microscopic Only - Urine, Clean Catch [[id number]]  (Abnormal) Collected: 06/16/25 1548    Specimen: Urine, Clean Catch Updated: 06/16/25 1608     RBC, UA 6-10 /HPF      WBC, UA 0-2 /HPF      Comment: Urine culture not indicated.        Bacteria, UA 4+ /HPF      Squamous Epithelial Cells, UA 7-12 /HPF      Hyaline Casts, UA None Seen /LPF      Methodology Automated Microscopy                  Condition on Discharge:  Stable    Vital Signs  Temp:  [97.6 °F (36.4 °C)-98.1 °F (36.7 °C)] 97.9 °F (36.6 °C)  Heart Rate:  [53-86] 59  Resp:  [11-20] 18  BP: ()/(47-84) 136/84      Physical Exam  Vitals reviewed.   Constitutional:       Appearance: She is not ill-appearing.   HENT:      Head: Normocephalic and atraumatic.      Right Ear: External ear normal.      Left Ear: External ear normal.      Nose: Nose normal.      Mouth/Throat:      Mouth: Mucous membranes are moist.   Eyes:      General:         Right eye: No discharge.         Left eye: No discharge.   Cardiovascular:      Rate and Rhythm: Normal rate and regular rhythm.      Pulses: Normal pulses.       Heart sounds: Normal heart sounds.   Pulmonary:      Effort: Pulmonary effort is normal.      Breath sounds: Normal breath sounds.   Abdominal:      General: Bowel sounds are normal. There is distension.      Tenderness: There is abdominal tenderness.   Musculoskeletal:         General: Normal range of motion.      Cervical back: Normal range of motion.   Skin:     General: Skin is warm and dry.   Neurological:      Mental Status: She is alert and oriented to person, place, and time.   Psychiatric:         Behavior: Behavior normal.              Discharge Disposition  Home or Self Care    Discharge Medications     Discharge Medications        New Medications        Instructions Start Date   docusate sodium 100 MG capsule   100 mg, Oral, 2 Times Daily      ondansetron 4 MG tablet  Commonly known as: Zofran   4 mg, Oral, Daily PRN      pantoprazole 40 MG EC tablet  Commonly known as: PROTONIX   40 mg, Oral, Every Early Morning   Start Date: June 20, 2025     polyethylene glycol 17 g packet  Commonly known as: MIRALAX   17 g, Oral, Daily   Start Date: June 20, 2025     traMADol 50 MG tablet  Commonly known as: Ultram   50 mg, Oral, Every 6 Hours PRN             Stop These Medications      cyclobenzaprine 10 MG tablet  Commonly known as: FLEXERIL     DULoxetine HCl 40 MG capsule delayed-release particles     ibuprofen 200 MG tablet  Commonly known as: ADVIL,MOTRIN     losartan 50 MG tablet  Commonly known as: COZAAR     meloxicam 15 MG tablet  Commonly known as: MOBIC     NON FORMULARY     omeprazole 40 MG capsule  Commonly known as: priLOSEC              Discharge Diet:   Diet Instructions       Diet: Regular/House Diet; Regular (IDDSI 7); Thin (IDDSI 0)      Discharge Diet: Regular/House Diet    Texture: Regular (IDDSI 7)    Fluid Consistency: Thin (IDDSI 0)            Activity at Discharge:   Activity Instructions       Other Activity Instructions      Activity Instructions: no lifting 10 # or more for 2 weeks             Follow-up Appointments  No future appointments.  Additional Instructions for the Follow-ups that You Need to Schedule       Discharge Follow-up with PCP   As directed       Currently Documented PCP:    Karin Briseno MD    PCP Phone Number:    [phone]     Follow Up Details: 2 weeks        Discharge Follow-up with Specified Provider: Dr Ceja; 2 Weeks   As directed      To: Dr Ceja   Follow Up: 2 Weeks                Test Results Pending at Discharge  Pending Results       Procedure [Order ID] Specimen - Date/Time    A1A, Quant & Genotype (Rfx Pheno) [[id number]] Collected: 06/17/25 2338    Specimen: Blood Updated: 06/18/25 0007    Anti-Smooth Muscle Antibody Titer [[id number]] Collected: 06/17/25 2338    Specimen: Blood Updated: 06/18/25 0006    Mitochondrial Antibodies, M2 [[id number]] Collected: 06/17/25 2338    Specimen: Blood Updated: 06/18/25 0006    Tissue Pathology Exam [[id number]] Collected: 06/18/25 1339    Specimen: Tissue from Gallbladder Updated: 06/19/25 0727             ABE Rivers  06/19/25  09:47 EDT    Time: Discharge 25 min

## 2025-06-19 NOTE — PROGRESS NOTES
LOS: 1 day   Patient Care Team:  Karin Briseno MD as PCP - General (Family Medicine)  Provider, No Known as PCP - Family Medicine      Subjective     Interval History:     Subjective: Patient status post cholecystectomy yesterday 6/18.  She states that she was up and walking around last night and plans to do so again this morning.  She is eating breakfast.  She mostly only has pain if she stands up which she attributes to gas pains.  She has not had a bowel movement since Cameron 6/15 for which she requested and took MiraLAX.  Will likely be discharged today.      ROS:   No chest pain, shortness of breath, or cough.        Medication Review:     Current Facility-Administered Medications:     acetaminophen (TYLENOL) tablet 650 mg, 650 mg, Oral, Q6H PRN, Abel Kaplan MD    sennosides-docusate (PERICOLACE) 8.6-50 MG per tablet 2 tablet, 2 tablet, Oral, BID PRN **AND** polyethylene glycol (MIRALAX) packet 17 g, 17 g, Oral, Daily PRN **AND** bisacodyl (DULCOLAX) EC tablet 5 mg, 5 mg, Oral, Daily PRN **AND** bisacodyl (DULCOLAX) suppository 10 mg, 10 mg, Rectal, Daily PRN, Abel Kaplan MD    Calcium Replacement - Follow Nurse / BPA Driven Protocol, , Not Applicable, PRN, Abel Kaplan MD    cyclobenzaprine (FLEXERIL) tablet 10 mg, 10 mg, Oral, TID PRN, Abel Kaplan MD, 10 mg at 06/19/25 0809    docusate sodium (COLACE) capsule 100 mg, 100 mg, Oral, BID, Halina Feliciano, APRN, 100 mg at 06/19/25 0952    DULoxetine (CYMBALTA) DR capsule 40 mg, 40 mg, Oral, Daily, Abel Kaplan MD, 40 mg at 06/19/25 0809    hydrALAZINE (APRESOLINE) injection 10 mg, 10 mg, Intravenous, Q6H PRN, Abel Kaplan MD    HYDROmorphone (DILAUDID) injection 0.5 mg, 0.5 mg, Intravenous, Q4H PRN, Abel Kaplan MD    Magnesium Standard Dose Replacement - Follow Nurse / BPA Driven Protocol, , Not Applicable, PRN, Abel Kaplan MD    melatonin tablet 5 mg, 5 mg, Oral, Nightly PRN, Brie Blanco, APRN, 5 mg at 06/18/25  2354    nicotine (NICODERM CQ) 21 MG/24HR patch 1 patch, 1 patch, Transdermal, Q24H, Abel Kaplan MD, 1 patch at 06/19/25 0809    ondansetron (ZOFRAN) injection 4 mg, 4 mg, Intravenous, Q6H PRN, Abel Kaplan MD    ondansetron (ZOFRAN) injection 4 mg, 4 mg, Intravenous, Q6H PRN, Abel Kaplan MD, 4 mg at 06/18/25 2155    pantoprazole (PROTONIX) EC tablet 40 mg, 40 mg, Oral, Q AM, Abel Kaplan MD, 40 mg at 06/19/25 0509    Phosphorus Replacement - Follow Nurse / BPA Driven Protocol, , Not Applicable, PRNRosaura Kristofer, MD    polyethylene glycol (MIRALAX) packet 17 g, 17 g, Oral, Daily, Abel Kaplan MD, 17 g at 06/19/25 0809    Potassium Replacement - Follow Nurse / BPA Driven Protocol, , Not Applicable, PRNRosaura Kristofer, MD    [COMPLETED] Insert Peripheral IV, , , Once **AND** sodium chloride 0.9 % flush 10 mL, 10 mL, Intravenous, PRN, Abel Kaplan MD    sodium chloride 0.9 % flush 10 mL, 10 mL, Intravenous, Q12H, Abel Kaplan MD, 10 mL at 06/18/25 1947    sodium chloride 0.9 % flush 10 mL, 10 mL, Intravenous, PRNRosaura Kristofer, MD    sodium chloride 0.9 % infusion 40 mL, 40 mL, Intravenous, PRN, Abel Kaplan MD    traMADol (ULTRAM) tablet 100 mg, 100 mg, Oral, Q4H PRNRosaura Kristofer, MD    traMADol (ULTRAM) tablet 50 mg, 50 mg, Oral, Q4H PRN, Abel Kaplan MD      Objective     Vital Signs  Vitals:    06/18/25 2030 06/19/25 0037 06/19/25 0532 06/19/25 0824   BP: 114/72 112/71 123/68 136/84   BP Location:    Left arm   Patient Position:    Sitting   Pulse: 74 71 53 59   Resp: 16 18 20 18   Temp: 97.7 °F (36.5 °C) 98 °F (36.7 °C) 97.7 °F (36.5 °C) 97.9 °F (36.6 °C)   TempSrc:    Oral   SpO2: 93% 96% 97% 100%   Weight:       Height:           Physical Exam:     General Appearance:    Awake and alert, in no acute distress   Head:    Normocephalic, without obvious abnormality   Eyes:          Conjunctivae normal, anicteric sclera           Lungs:     respirations regular, even  and unlabored   Abdomen:     Soft, tenderness to palpation, no rebound or guarding, non-distended       Extremities:   No edema, no cyanosis   Skin:   No bruising or rash, no jaundice        Results Review:    Results from last 7 days   Lab Units 06/19/25 0055 06/17/25 2338 06/17/25 0259 06/16/25  1555   WBC 10*3/mm3 8.54 6.52 7.91 7.57   HEMOGLOBIN g/dL 11.7* 12.4 12.0 13.1   PLATELETS 10*3/mm3 264 273 253 264     Results from last 7 days   Lab Units 06/19/25 0055 06/17/25 2338 06/17/25 0259 06/16/25  1555   SODIUM mmol/L 137 141 139 138   POTASSIUM mmol/L 4.0 3.8 3.4* 3.8   CHLORIDE mmol/L 106 108* 111* 105   CO2 mmol/L 22.0 22.5 21.6* 20.6*   BUN mg/dL 6.8 7.6 10.0 11.7   CREATININE mg/dL 0.61 0.50* 0.56* 0.57   GLUCOSE mg/dL 244* 74 87 94   ALBUMIN g/dL 4.0 3.7  --  4.4   BILIRUBIN mg/dL 0.3 0.3  --  0.6   ALK PHOS U/L 96 102  --  141*   AST (SGOT) U/L 21 20  --  70*   ALT (SGPT) U/L 59* 79*  --  158*   INR   --  1.15*  --   --    APTT seconds  --  33.1  --   --    LIPASE U/L 40 78* 165* 480*   CRP mg/dL  --  1.02*  --   --      Estimated Creatinine Clearance: 116.2 mL/min (by C-G formula based on SCr of 0.61 mg/dL).  Lab Results   Component Value Date    HGBA1C 5.54 06/16/2025    HGBA1C 5.50 05/30/2023         Infection     UA    Results from last 7 days   Lab Units 06/16/25  1548   NITRITE UA  Negative   WBC UA /HPF 0-2   BACTERIA UA /HPF 4+*   SQUAM EPITHEL UA /HPF 7-12*     Microbiology Results (last 10 days)       Procedure Component Value - Date/Time    COVID-19, FLU A/B, RSV PCR 1 HR TAT - Swab, Nasopharynx [[id number]]  (Normal) Collected: 06/16/25 2000    Lab Status: Final result Specimen: Swab from Nasopharynx Updated: 06/16/25 2047     COVID19 Not Detected     Influenza A PCR Not Detected     Influenza B PCR Not Detected     RSV, PCR Not Detected    Narrative:      Fact sheet for providers: https://www.fda.gov/media/831293/download    Fact sheet for patients:  https://www.fda.gov/media/038872/download    Test performed by PCR.          Imaging Results (Last 72 Hours)       Procedure Component Value Units Date/Time    XR Chest 1 View [[id number]] Collected: 06/18/25 0713     Updated: 06/18/25 0716    Narrative:      XR CHEST 1 VW    Date of Exam: 6/18/2025 5:45 AM EDT    Indication: Pre-op    Comparison: 7/18/2020    Findings:  The cardiomediastinal silhouette is within normal limits. Lungs are clear. No focal consolidation, pneumothorax, or significant pleural effusion. Osseous structures grossly intact.      Impression:      Impression:  No acute process.          Electronically Signed: Sonu Tenorio MD    6/18/2025 7:14 AM EDT    Workstation ID: [id number]    US Abdomen Limited [[id number]] Collected: 06/16/25 2229     Updated: 06/16/25 2235    Narrative:      US ABDOMEN LIMITED    Date of Exam: 6/16/2025 9:28 PM EDT    Indication: cholecystitis.    Comparison: Same day contrast-enhanced CT of the abdomen and pelvis.    Technique: Grayscale and color Doppler ultrasound evaluation of the right upper quadrant was performed.      Findings:    Limited study due to overlying bowel gas.    The liver appears normal in size and echogenicity. No focal lesions identified. Normal flow is present within the hepatic vasculature.    Multiple small gallstones are visualized. There is borderline gallbladder wall thickening measuring 0.3 cm. Gallbladder is partially contracted. No pericholecystic fluid is visualized. Positive Suggs sign was reported by the sonographer. Common bile   duct is within normal limits in caliber and measures 0.4 cm    The pancreas is not visualized due to overlying bowel gas.    The right kidney appears normal in size with no focal lesions. No evidence of nephrolithiasis or hydronephrosis. The right kidney measures 11.1 x 5.6 x 5.7 cm.           Impression:      Impression:    Cholelithiasis. Borderline gallbladder wall thickening. Positive Suggs sign was  reported by the sonographer. Cannot exclude acute cholecystitis. Correlate with patient symptoms. This can be further evaluated with HIDA scan as clinically warranted.        Electronically Signed: Marcus Newell MD    6/16/2025 10:33 PM EDT    Workstation ID: [id number]    CT Abdomen Pelvis With Contrast [[id number]] Collected: 06/16/25 1723     Updated: 06/16/25 1732    Narrative:      CT ABDOMEN PELVIS W CONTRAST    Date of Exam: 6/16/2025 4:45 PM EDT    Indication: epigastric and bilateral CVA pain.    Comparison: Contrast-enhanced CT of the abdomen pelvis performed on March 22, 2024    Technique: Axial CT images were obtained of the abdomen and pelvis following the uneventful intravenous administration of iodinated contrast. Sagittal and coronal reconstructions were performed.  Automated exposure control and iterative reconstruction   methods were used.        Findings:    Lung Bases:  No focal consolidation or effusion. Faintly calcified nodule in the left lower lobe appears stable when compared to prior CT, likely representing granuloma.    Peritoneum:  No free intraperitoneal air or fluid.     Abdominal wall:  Small fat-containing umbilical hernia is visualized.    Liver:  Liver is normal in size and contour. No focal lesions.    Biliary/Gallbladder:  The gallbladder is partially contracted. There is evidence of mild gallbladder wall thickening and pericholecystic edema versus trace fluid. No radiopaque gallstones are visualized. The biliary tree is nondilated.    Pancreas:  Pancreas is within normal limits. There is no evidence of pancreatic mass or peripancreatic inflammatory changes.    Spleen:  Spleen is normal in size and contour.    Gastrointestinal/Mesentery:   No evidence of bowel obstruction or gross inflammatory changes. The appendix appears within normal limits. There is sigmoid diverticulosis without evidence of diverticulitis.    Adrenals:  Adrenal glands are unremarkable.    Kidneys:  The kidneys  are in anatomic position. No evidence of nephrolithiasis. No evidence of hydronephrosis or significant perinephric fat stranding.    Bladder:   The urinary bladder is unremarkable.    Reproductive organs:    There is mild endometrial thickening for the patient's age measuring 8 mm. No adnexal masses are visualized.    Lymph Nodes:  No significant adenopathy is identified.     Vasculature:  Small scattered calcified plaques are visualized. The abdominal aorta is normal in caliber.    Osseous Structures:    No acute fracture or aggressive lesions.        Impression:      Impression:  1.Partially contracted gallbladder with mild gallbladder wall thickening and pericholecystic edema versus trace fluid. Correlate for symptoms of cholecystitis. Consider further evaluation with right upper quadrant ultrasound.  2.Mild endometrial thickening for the patient's age measuring 8 mm. Correlate with menstrual history. Consider further evaluation with pelvic ultrasound.        Electronically Signed: Marcus Newell MD    6/16/2025 5:30 PM EDT    Workstation ID: [id number]            Assessment & Plan     Patient is a 54-year-old female with a past medical history of nondysplastic Miller's esophagus, GERD, hiatal hernia, diverticulosis, hypertension, and tobacco use who presented to [facility] ED on 6/16/2025 with complaints of abdominal pain.       ASSESSMENT:  Epigastric pain  Abnormal CT and RUQ US of gallbladder  Cholelithiasis  Nausea and vomiting-resolved  Elevated LFTs-improving  Elevated lipase-resolved  Obese-on Mounjaro  History of nondysplastic Miller's esophagus  Tobacco abuse     PLAN:  - EGD 6/17 showed mild erythema in the stomach consistent with gastritis and medium sized sliding hiatal hernia, otherwise normal.  - CT A/P with contrast-partially contracted gallbladder with mild gallbladder wall thickening and pericholecystic edema versus trace fluid.  Mild endometrial thickening of patient's age measuring 8 mm.  - RUQ  US-cholelithiasis.  CBD 0.4 cm.  Borderline gallbladder wall thickening.  Positive Suggs sign.  - S/p cholecystectomy yesterday 6/18.  IOC was unable to be done.  - GLP-1 may be exacerbating GI symptoms.   - Lipase further improved to 40 from 78.  Was 480 on admission.  No mention of pancreatitis on CT interpretation.    - Continue daily MiraLAX for constipation.  Would recommend to continue this daily at home as well.  Okay to use Gas-X as needed at home for gas pain.  - Continue pantoprazole 40 mg daily for Miller's.  - Avoid NSAIDs.  - Alk phos 96 from 102, AST 21 from 20, ALT 59 from 79, total bilirubin 0.3.  Not consistent with choledocholithiasis.  .  - Hep A total antibody positive with negative hep A IgM.  Hepatitis B and C nonreactive.  aFP, iron panel, ceruloplasmin, lipid panel all normal.  - Recommend complete tobacco cessation  - Supportive care.  - Okay for discharge from GI standpoint.    Electronically signed by Britney Sanders PA-C, 06/19/25, 9:53 AM EDT.

## 2025-06-19 NOTE — CASE MANAGEMENT/SOCIAL WORK
Case Management Discharge Note      Final Note: HOME         Selected Continued Care - Discharged on 6/19/2025 Admission date: 6/16/2025 - Discharge disposition: Home or Self Care            Transportation Services  Private: Car    Final Discharge Disposition Code: 01 - home or self-care

## 2025-06-19 NOTE — PROGRESS NOTES
General Surgery Progress Note    Name: Gerri Salcido ADMIT: 2025   : 1970  PCP: Karin Briseno MD    MRN: [id number] LOS: 1 days   AGE/SEX: 54 y.o. female  ROOM: [address] [place] [address]    Chief Complaint   Patient presents with    Abdominal Pain     Abd pain, upper abd  radiates to back started on Saturday, Pt has some nausea and vomiting and dark urine.       Subjective     Patient seen and examined.  Vital signs stable, afebrile.  Overall doing well this morning, does report gas pain.  Pain is controlled.  Tolerating regular diet without nausea or vomiting.      Objective     Scheduled Medications:   docusate sodium, 100 mg, Oral, BID  DULoxetine, 40 mg, Oral, Daily  nicotine, 1 patch, Transdermal, Q24H  pantoprazole, 40 mg, Oral, Q AM  polyethylene glycol, 17 g, Oral, Daily  sodium chloride, 10 mL, Intravenous, Q12H        Active Infusions:       As Needed Medications:    acetaminophen    senna-docusate sodium **AND** polyethylene glycol **AND** bisacodyl **AND** bisacodyl    Calcium Replacement - Follow Nurse / BPA Driven Protocol    cyclobenzaprine    hydrALAZINE    HYDROmorphone    Magnesium Standard Dose Replacement - Follow Nurse / BPA Driven Protocol    melatonin    ondansetron    ondansetron    Phosphorus Replacement - Follow Nurse / BPA Driven Protocol    Potassium Replacement - Follow Nurse / BPA Driven Protocol    [COMPLETED] Insert Peripheral IV **AND** sodium chloride    sodium chloride    sodium chloride    traMADol    traMADol    Vital Signs  Vital Signs Patient Vitals for the past 24 hrs:   BP Temp Temp src Pulse Resp SpO2   25 0824 136/84 97.9 °F (36.6 °C) Oral 59 18 100 %   25 0532 123/68 97.7 °F (36.5 °C) -- 53 20 97 %   25 0037 112/71 98 °F (36.7 °C) -- 71 18 96 %   25 2030 114/72 97.7 °F (36.5 °C) -- 74 16 93 %   25 1631 104/55 97.6 °F (36.4 °C) Oral 58 13 97 %   25 1619 100/52 97.6 °F (36.4 °C) Oral 86 11 95 %   25 1615 96/52 -- -- 61  13 94 %   06/18/25 1608 103/50 -- -- 58 12 93 %   06/18/25 1600 117/54 -- -- 63 -- 93 %   06/18/25 1553 106/57 -- -- 63 12 94 %   06/18/25 1538 -- -- -- 65 14 97 %   06/18/25 1533 101/47 -- -- 67 14 95 %   06/18/25 1528 115/51 -- -- 62 15 95 %   06/18/25 1523 116/54 -- -- 61 15 95 %   06/18/25 1224 135/59 98.1 °F (36.7 °C) Oral 71 15 98 %   06/18/25 1136 116/74 97.7 °F (36.5 °C) Oral 69 16 99 %     I/O:  I/O last 3 completed shifts:  In: 1420 [P.O.:120; I.V.:1300]  Out: -     Physical Exam:  Physical Exam  Constitutional:       General: She is not in acute distress.  Cardiovascular:      Rate and Rhythm: Normal rate.   Pulmonary:      Effort: Pulmonary effort is normal. No respiratory distress.   Abdominal:      General: There is no distension.      Palpations: Abdomen is soft.      Tenderness: There is abdominal tenderness. There is no guarding.      Comments: Incisions clean dry and intact with skin glue   Neurological:      Mental Status: She is alert. Mental status is at baseline.   Psychiatric:         Mood and Affect: Mood normal.         Behavior: Behavior normal.         Results Review:     CBC    Results from last 7 days   Lab Units 06/19/25 0055 06/17/25 2338 06/17/25 0259 06/16/25  1555   WBC 10*3/mm3 8.54 6.52 7.91 7.57   HEMOGLOBIN g/dL 11.7* 12.4 12.0 13.1   PLATELETS 10*3/mm3 264 273 253 264     BMP   Results from last 7 days   Lab Units 06/19/25 0055 06/17/25 2338 06/17/25 0259 06/16/25  1555   SODIUM mmol/L 137 141 139 138   POTASSIUM mmol/L 4.0 3.8 3.4* 3.8   CHLORIDE mmol/L 106 108* 111* 105   CO2 mmol/L 22.0 22.5 21.6* 20.6*   BUN mg/dL 6.8 7.6 10.0 11.7   CREATININE mg/dL 0.61 0.50* 0.56* 0.57   GLUCOSE mg/dL 244* 74 87 94     Radiology(recent) XR Chest 1 View  Result Date: 6/18/2025  Impression: No acute process. Electronically Signed: Sonu Tenorio MD  6/18/2025 7:14 AM EDT  Workstation ID: [id number]     I reviewed the patient's new clinical results.    Assessment & Plan       Acute  cholecystitis    Pancreatitis    Abdominal pain    Essential hypertension    Cigarette smoker    GERD without esophagitis    Obesity (BMI 30-39.9)    Gallstone pancreatitis    Epigastric abdominal pain      54 y.o. female POD 1 status post robotic cholecystectomy with attempted IOC    -Diet as tolerated  -Antiemetics and pain medication as needed  -Needs to ambulate as much as possible, out of bed to chair  -Use incentive spirometer bedside 10 times an hour while awake  -Overall, appears to be doing well.  Okay to discharge per general surgery perspective.  Discharge instructions discussed.  Will follow-up in office with Babita SARAH in 2 weeks.        This note was created using Dragon Voice Recognition software.    KEARA Harrell  06/19/25  11:00 EDT

## 2025-06-19 NOTE — PLAN OF CARE
Problem: Adult Inpatient Plan of Care  Goal: Plan of Care Review  Outcome: Progressing  Goal: Patient-Specific Goal (Individualized)  Outcome: Progressing  Goal: Absence of Hospital-Acquired Illness or Injury  Outcome: Progressing  Intervention: Identify and Manage Fall Risk  Recent Flowsheet Documentation  Taken 6/18/2025 2000 by Gerri Kebede RN  Safety Promotion/Fall Prevention: safety round/check completed  Intervention: Prevent Skin Injury  Recent Flowsheet Documentation  Taken 6/18/2025 2000 by Gerri Kebede RN  Body Position: position changed independently  Intervention: Prevent and Manage VTE (Venous Thromboembolism) Risk  Recent Flowsheet Documentation  Taken 6/18/2025 2000 by Gerri Kebede RN  VTE Prevention/Management:   bilateral   SCDs (sequential compression devices) off   patient refused intervention  Intervention: Prevent Infection  Recent Flowsheet Documentation  Taken 6/18/2025 2000 by Gerri Kebede RN  Infection Prevention:   single patient room provided   hand hygiene promoted  Goal: Optimal Comfort and Wellbeing  Outcome: Progressing  Intervention: Provide Person-Centered Care  Recent Flowsheet Documentation  Taken 6/18/2025 2000 by Gerri Kebede RN  Trust Relationship/Rapport:   care explained   questions answered   thoughts/feelings acknowledged  Goal: Readiness for Transition of Care  Outcome: Progressing     Problem: Pain Acute  Goal: Optimal Pain Control and Function  Outcome: Progressing  Intervention: Optimize Psychosocial Wellbeing  Recent Flowsheet Documentation  Taken 6/18/2025 2000 by Gerri Kebede RN  Supportive Measures: active listening utilized  Diversional Activities:   smartphone   television  Intervention: Prevent or Manage Pain  Recent Flowsheet Documentation  Taken 6/18/2025 2000 by Gerri Kebede RN  Bowel Elimination Promotion:   adequate fluid intake promoted   ambulation promoted  Sleep/Rest Enhancement:   awakenings minimized   noise level reduced  Medication  Review/Management: medications reviewed     Problem: Fall Injury Risk  Goal: Absence of Fall and Fall-Related Injury  Outcome: Progressing  Intervention: Identify and Manage Contributors  Recent Flowsheet Documentation  Taken 6/18/2025 2000 by Gerri Kebede RN  Medication Review/Management: medications reviewed  Self-Care Promotion: independence encouraged  Intervention: Promote Injury-Free Environment  Recent Flowsheet Documentation  Taken 6/18/2025 2000 by Gerri Kebede, RN  Safety Promotion/Fall Prevention: safety round/check completed   Goal Outcome Evaluation:

## 2025-06-20 ENCOUNTER — TELEPHONE (OUTPATIENT)
Dept: SURGERY | Facility: CLINIC | Age: 55
End: 2025-06-20
Payer: COMMERCIAL

## 2025-06-20 LAB
LAB AP CASE REPORT: NORMAL
PATH REPORT.FINAL DX SPEC: NORMAL
PATH REPORT.GROSS SPEC: NORMAL

## 2025-06-20 NOTE — TELEPHONE ENCOUNTER
I called Gerri Salcido to check on them post operatively. We discussed instructions and post op office visit. Encouraged to call the office with any other questions.

## 2025-06-24 LAB
A1A RFX TO PHENOTYPE: NORMAL
A1AT SERPL-MCNC: 150 MG/DL (ref 101–187)
LAB DIRECTOR NAME PROVIDER: NORMAL
SERPINA1 GENE MUT ANL BLD/T: NORMAL
SERPINA1 GENE MUT TESTED BLD/T: NORMAL

## 2025-07-03 ENCOUNTER — OFFICE VISIT (OUTPATIENT)
Dept: SURGERY | Facility: CLINIC | Age: 55
End: 2025-07-03
Payer: COMMERCIAL

## 2025-07-03 VITALS
BODY MASS INDEX: 32.44 KG/M2 | HEART RATE: 85 BPM | SYSTOLIC BLOOD PRESSURE: 130 MMHG | DIASTOLIC BLOOD PRESSURE: 78 MMHG | HEIGHT: 64 IN | OXYGEN SATURATION: 97 % | WEIGHT: 190 LBS | TEMPERATURE: 98.6 F

## 2025-07-03 DIAGNOSIS — K81.0 ACUTE CHOLECYSTITIS: Primary | ICD-10-CM

## 2025-07-03 RX ORDER — GABAPENTIN 100 MG/1
100 CAPSULE ORAL 3 TIMES DAILY
COMMUNITY

## 2025-07-03 RX ORDER — DULOXETIN HYDROCHLORIDE 20 MG/1
CAPSULE, DELAYED RELEASE ORAL
COMMUNITY

## 2025-07-03 RX ORDER — LOSARTAN POTASSIUM 50 MG/1
TABLET ORAL
COMMUNITY
Start: 2025-06-21

## 2025-07-10 NOTE — PROGRESS NOTES
"Chief Complaint  Post-op Follow-up (POST-OP - LAP MARTHA 6/18/25 (DENISSE))    Subjective        Gerri Salcido presents to [facility] GENERAL SURGERY status post laparoscopic cholecystectomy on 6/10/2025 with Dr. Kaplan.  Overall doing well, no complaints.  Denies any pain.  Denies fever or chills.  Tolerating regular diet without nausea or vomiting.  Having regular bowel function.      Objective   Vital Signs:  /78 (BP Location: Left arm, Patient Position: Sitting, Cuff Size: Large Adult)   Pulse 85   Temp 98.6 °F (37 °C) (Infrared)   Ht 162.6 cm (64\")   Wt 86.2 kg (190 lb)   SpO2 97%   BMI 32.61 kg/m²   Estimated body mass index is 32.61 kg/m² as calculated from the following:    Height as of this encounter: 162.6 cm (64\").    Weight as of this encounter: 86.2 kg (190 lb).          Physical Exam  Constitutional:       General: She is not in acute distress.  Cardiovascular:      Rate and Rhythm: Normal rate.   Pulmonary:      Effort: Pulmonary effort is normal. No respiratory distress.   Abdominal:      General: There is no distension.      Palpations: Abdomen is soft.      Tenderness: There is no abdominal tenderness. There is no guarding.      Comments: Incisions appear to have healed well.   Neurological:      Mental Status: She is alert. Mental status is at baseline.   Psychiatric:         Mood and Affect: Mood normal.         Behavior: Behavior normal.        Result Review :                Assessment and Plan   Diagnoses and all orders for this visit:    1. Acute cholecystitis (Primary)    status post laparoscopic cholecystectomy on 6/10/2025 with Dr. Kaplan.  Overall doing well, no complaints.  Denies any pain.  Denies fever or chills.  Tolerating regular diet without nausea or vomiting.  Having regular bowel function.  Pathology discussed with patient.  No further physical restrictions.  No dietary restrictions.  Can follow-up as needed.         Follow Up   No follow-ups on " file.  Patient was given instructions and counseling regarding her condition or for health maintenance advice. Please see specific information pulled into the AVS if appropriate.

## (undated) DEVICE — REDUCER: Brand: ENDOWRIST

## (undated) DEVICE — ANESTH CIRCUIT 60IN 3LTR-LF: Brand: MEDLINE INDUSTRIES, INC.

## (undated) DEVICE — ANTIBACTERIAL UNDYED BRAIDED (POLYGLACTIN 910), SYNTHETIC ABSORBABLE SUTURE: Brand: COATED VICRYL

## (undated) DEVICE — COLUMN DRAPE

## (undated) DEVICE — GENERAL LAPAROSCOPY CDS: Brand: MEDLINE INDUSTRIES, INC.

## (undated) DEVICE — BG RETRV TISS SUPERBAG INTRO RIP/STOP NLY 10MM 240ML MD

## (undated) DEVICE — SUT VIC 0 UR6 27IN VCP603H

## (undated) DEVICE — KT SURG TURNOVER 050

## (undated) DEVICE — THE STERILE CAMERA HANDLE COVER IS FOR USE WITH THE STERIS SURGICAL LIGHTING AND VISUALIZATION SYSTEMS.

## (undated) DEVICE — 3M™ STERI-STRIP™ REINFORCED ADHESIVE SKIN CLOSURES, R1547, 1/2 IN X 4 IN (12 MM X 100 MM), 6 STRIPS/ENVELOPE: Brand: 3M™ STERI-STRIP™

## (undated) DEVICE — 40580 - THE PINK PAD - ADVANCED TRENDELENBURG POSITIONING KIT: Brand: 40580 - THE PINK PAD - ADVANCED TRENDELENBURG POSITIONING KIT

## (undated) DEVICE — CFF SCD HEMFRCE SEQ CLF STD XL

## (undated) DEVICE — TROC BLADLES AIRSEAL/OPTI THRD 8X120MM 1P/U

## (undated) DEVICE — BLANKT WARM UPPR/BDY ARM/OUT 57X196CM

## (undated) DEVICE — BITEBLOCK ENDO W/STRAP 60F A/ LF DISP

## (undated) DEVICE — SOL IRR NACL 0.9PCT BO 1000ML

## (undated) DEVICE — SYR LUERLOK 30CC

## (undated) DEVICE — THE STERILE LIGHT HANDLE COVER IS USED WITH STERIS SURGICAL LIGHTING AND VISUALIZATION SYSTEMS.

## (undated) DEVICE — SOL IRR H2O BO 1000ML STRL

## (undated) DEVICE — ENDOPATH XCEL BLADELESS TROCARS WITH STABILITY SLEEVES: Brand: ENDOPATH XCEL

## (undated) DEVICE — CLEAR-TRAC DISPOSABLE STOPCOCK: Brand: CLEAR-TRAC

## (undated) DEVICE — ARM DRAPE

## (undated) DEVICE — ST TBG AIRSEAL BIF FLTR W/ACT/CHARCOAL/FLTR

## (undated) DEVICE — GLOVE,SURG,SENSICARE SLT,LF,PF,7.5: Brand: MEDLINE

## (undated) DEVICE — ENDOPATH XCEL WITH OPTIVIEW TECHNOLOGY UNIVERSAL TROCAR STABILITY SLEEVES: Brand: ENDOPATH XCEL OPTIVIEW

## (undated) DEVICE — SUCTION IRRIGATOR: Brand: ENDOWRIST

## (undated) DEVICE — SINGLE-USE BIOPSY FORCEPS: Brand: RADIAL JAW 4

## (undated) DEVICE — 3M™ IOBAN™ 2 ANTIMICROBIAL INCISE DRAPE 6650EZ: Brand: IOBAN™ 2

## (undated) DEVICE — PK ENDO GI 50

## (undated) DEVICE — BLADELESS OBTURATOR: Brand: WECK VISTA

## (undated) DEVICE — SEAL

## (undated) DEVICE — LAPAROVUE VISIBILITY SYSTEM LAPAROSCOPIC SOLUTIONS: Brand: LAPAROVUE

## (undated) DEVICE — SUT MNCRYL PLS ANTIB UD 4/0 PS2 18IN

## (undated) DEVICE — PASS SUT PRO BARIATRIC XL W/TROC SWABS